# Patient Record
Sex: MALE | Race: BLACK OR AFRICAN AMERICAN | Employment: FULL TIME | URBAN - METROPOLITAN AREA
[De-identification: names, ages, dates, MRNs, and addresses within clinical notes are randomized per-mention and may not be internally consistent; named-entity substitution may affect disease eponyms.]

---

## 2019-07-10 ENCOUNTER — APPOINTMENT (OUTPATIENT)
Dept: GENERAL RADIOLOGY | Age: 44
End: 2019-07-10
Attending: EMERGENCY MEDICINE
Payer: SELF-PAY

## 2019-07-10 ENCOUNTER — HOSPITAL ENCOUNTER (OUTPATIENT)
Age: 44
Setting detail: OBSERVATION
Discharge: HOME | End: 2019-07-12
Attending: EMERGENCY MEDICINE | Admitting: INTERNAL MEDICINE
Payer: SELF-PAY

## 2019-07-10 ENCOUNTER — APPOINTMENT (OUTPATIENT)
Dept: CT IMAGING | Age: 44
End: 2019-07-10
Attending: EMERGENCY MEDICINE
Payer: SELF-PAY

## 2019-07-10 DIAGNOSIS — I16.9 HYPERTENSIVE CRISIS: Primary | ICD-10-CM

## 2019-07-10 DIAGNOSIS — R42 DIZZINESS: ICD-10-CM

## 2019-07-10 LAB
ALBUMIN SERPL-MCNC: 3.9 G/DL (ref 3.4–5)
ALBUMIN/GLOB SERPL: 1 {RATIO} (ref 0.8–1.7)
ALP SERPL-CCNC: 112 U/L (ref 45–117)
ALT SERPL-CCNC: 30 U/L (ref 16–61)
ANION GAP SERPL CALC-SCNC: 8 MMOL/L (ref 3–18)
AST SERPL-CCNC: 20 U/L (ref 15–37)
ATRIAL RATE: 61 BPM
BASOPHILS # BLD: 0 K/UL (ref 0–0.1)
BASOPHILS NFR BLD: 0 % (ref 0–2)
BILIRUB SERPL-MCNC: 0.4 MG/DL (ref 0.2–1)
BUN SERPL-MCNC: 11 MG/DL (ref 7–18)
BUN/CREAT SERPL: 11 (ref 12–20)
CALCIUM SERPL-MCNC: 9.1 MG/DL (ref 8.5–10.1)
CALCULATED P AXIS, ECG09: 23 DEGREES
CALCULATED R AXIS, ECG10: 59 DEGREES
CALCULATED T AXIS, ECG11: -69 DEGREES
CHLORIDE SERPL-SCNC: 103 MMOL/L (ref 100–108)
CK MB CFR SERPL CALC: 1.6 % (ref 0–4)
CK MB SERPL-MCNC: 2 NG/ML (ref 5–25)
CK SERPL-CCNC: 125 U/L (ref 39–308)
CO2 SERPL-SCNC: 28 MMOL/L (ref 21–32)
CREAT SERPL-MCNC: 1.02 MG/DL (ref 0.6–1.3)
DIAGNOSIS, 93000: NORMAL
DIFFERENTIAL METHOD BLD: ABNORMAL
EOSINOPHIL # BLD: 0 K/UL (ref 0–0.4)
EOSINOPHIL NFR BLD: 0 % (ref 0–5)
ERYTHROCYTE [DISTWIDTH] IN BLOOD BY AUTOMATED COUNT: 14 % (ref 11.6–14.5)
GLOBULIN SER CALC-MCNC: 4.1 G/DL (ref 2–4)
GLUCOSE SERPL-MCNC: 150 MG/DL (ref 74–99)
HCT VFR BLD AUTO: 47.2 % (ref 36–48)
HGB BLD-MCNC: 15.5 G/DL (ref 13–16)
LYMPHOCYTES # BLD: 1.2 K/UL (ref 0.9–3.6)
LYMPHOCYTES NFR BLD: 9 % (ref 21–52)
MCH RBC QN AUTO: 27.4 PG (ref 24–34)
MCHC RBC AUTO-ENTMCNC: 32.8 G/DL (ref 31–37)
MCV RBC AUTO: 83.4 FL (ref 74–97)
MONOCYTES # BLD: 0.5 K/UL (ref 0.05–1.2)
MONOCYTES NFR BLD: 4 % (ref 3–10)
NEUTS SEG # BLD: 10.9 K/UL (ref 1.8–8)
NEUTS SEG NFR BLD: 87 % (ref 40–73)
P-R INTERVAL, ECG05: 214 MS
PLATELET # BLD AUTO: 238 K/UL (ref 135–420)
PMV BLD AUTO: 9.3 FL (ref 9.2–11.8)
POTASSIUM SERPL-SCNC: 4 MMOL/L (ref 3.5–5.5)
PROT SERPL-MCNC: 8 G/DL (ref 6.4–8.2)
Q-T INTERVAL, ECG07: 460 MS
QRS DURATION, ECG06: 90 MS
QTC CALCULATION (BEZET), ECG08: 463 MS
RBC # BLD AUTO: 5.66 M/UL (ref 4.7–5.5)
SODIUM SERPL-SCNC: 139 MMOL/L (ref 136–145)
TROPONIN I SERPL-MCNC: <0.02 NG/ML (ref 0–0.04)
VENTRICULAR RATE, ECG03: 61 BPM
WBC # BLD AUTO: 12.6 K/UL (ref 4.6–13.2)

## 2019-07-10 PROCEDURE — 70450 CT HEAD/BRAIN W/O DYE: CPT

## 2019-07-10 PROCEDURE — 96374 THER/PROPH/DIAG INJ IV PUSH: CPT

## 2019-07-10 PROCEDURE — 96375 TX/PRO/DX INJ NEW DRUG ADDON: CPT

## 2019-07-10 PROCEDURE — 93005 ELECTROCARDIOGRAM TRACING: CPT

## 2019-07-10 PROCEDURE — 74011250636 HC RX REV CODE- 250/636: Performed by: EMERGENCY MEDICINE

## 2019-07-10 PROCEDURE — 96361 HYDRATE IV INFUSION ADD-ON: CPT

## 2019-07-10 PROCEDURE — 85025 COMPLETE CBC W/AUTO DIFF WBC: CPT

## 2019-07-10 PROCEDURE — 71045 X-RAY EXAM CHEST 1 VIEW: CPT

## 2019-07-10 PROCEDURE — 80053 COMPREHEN METABOLIC PANEL: CPT

## 2019-07-10 PROCEDURE — 99285 EMERGENCY DEPT VISIT HI MDM: CPT

## 2019-07-10 PROCEDURE — 82550 ASSAY OF CK (CPK): CPT

## 2019-07-10 RX ORDER — MECLIZINE HCL 12.5 MG 12.5 MG/1
25 TABLET ORAL
Status: COMPLETED | OUTPATIENT
Start: 2019-07-10 | End: 2019-07-10

## 2019-07-10 RX ORDER — ONDANSETRON 2 MG/ML
4 INJECTION INTRAMUSCULAR; INTRAVENOUS
Status: COMPLETED | OUTPATIENT
Start: 2019-07-10 | End: 2019-07-10

## 2019-07-10 RX ORDER — LABETALOL HCL 20 MG/4 ML
20 SYRINGE (ML) INTRAVENOUS
Status: COMPLETED | OUTPATIENT
Start: 2019-07-10 | End: 2019-07-10

## 2019-07-10 RX ADMIN — LABETALOL 20 MG/4 ML (5 MG/ML) INTRAVENOUS SYRINGE 20 MG: at 19:54

## 2019-07-10 RX ADMIN — ONDANSETRON 4 MG: 2 INJECTION INTRAMUSCULAR; INTRAVENOUS at 19:54

## 2019-07-10 RX ADMIN — SODIUM CHLORIDE 1000 ML: 900 INJECTION, SOLUTION INTRAVENOUS at 20:22

## 2019-07-10 RX ADMIN — MECLIZINE 25 MG: 12.5 TABLET ORAL at 19:55

## 2019-07-10 NOTE — ED PROVIDER NOTES
EMERGENCY DEPARTMENT HISTORY AND PHYSICAL EXAM    Date: 7/10/2019  Patient Name: Aldair Mcmullen    History of Presenting Illness     Chief Complaint   Patient presents with    Dizziness    Vomiting    Hypertension         HPI:   7:22 PM  Aldair Mcmullen is a 41 y.o. male with no significant PMHX  who presents to the emergency department C/O dizziness. Patient states he generally has not felt well all week. He however this morning was laying in bed until about 1 PM when he tried to get out of bed and felt dizzy. He describes the dizziness is spinning, this was associated with nausea vomiting for about 4 hours. He states he has right lazy eye, otherwise no visual problems. He states the nausea vomiting is improved since he arrived in the ED but the dizziness is changed now feeling off balance when he tries to stand. He has no numbness tingling or weakness in the in of his extremities. Patient denies any history of hypertension, he does not smoke, no history of diabetes or hypercholesterolemia, he however states his mother had stroke same age as he is. PCP: Other, MD Greg        Past History     Past Medical History:  History reviewed. No pertinent past medical history. Past Surgical History:  History reviewed. No pertinent surgical history. Family History:  History reviewed. No pertinent family history. Social History:  Social History     Tobacco Use    Smoking status: Current Every Day Smoker     Packs/day: 0.25    Smokeless tobacco: Never Used   Substance Use Topics    Alcohol use: Yes     Alcohol/week: 0.6 oz     Types: 1 Cans of beer per week     Frequency: Never    Drug use: Never       Allergies:  No Known Allergies      Review of Systems   Review of Systems   Constitutional: Negative for chills and fever. HENT: Negative for congestion and sore throat. Respiratory: Negative for cough and shortness of breath. Cardiovascular: Negative for chest pain and palpitations. Gastrointestinal: Negative for abdominal pain, nausea and vomiting. Genitourinary: Negative for dysuria, flank pain and frequency. Musculoskeletal: Negative for arthralgias, joint swelling and myalgias. Skin: Negative for color change and wound. Neurological: Positive for dizziness. Negative for weakness, light-headedness and headaches. Hematological: Negative for adenopathy. Physical Exam     Vitals:    07/10/19 2230 07/10/19 2245 07/10/19 2330 07/10/19 2345   BP: (!) 162/95 (!) 176/93 (!) 166/91 153/89   Pulse: 64 69 62 64   Resp: 17 19 19 21   Temp:  98.4 °F (36.9 °C)     SpO2: 100% 100% 100% 100%   Weight:       Height:         Physical Exam   Constitutional: He is oriented to person, place, and time. He appears well-developed and well-nourished. No distress. Patient appears in no distress. HENT:   Head: Normocephalic and atraumatic. Head is without right periorbital erythema and without left periorbital erythema. Right Ear: External ear normal. No drainage or swelling. Tympanic membrane is not perforated, not erythematous and not bulging. Left Ear: External ear normal. No drainage or swelling. Tympanic membrane is not perforated, not erythematous and not bulging. Nose: Nose normal. No mucosal edema or rhinorrhea. Right sinus exhibits no maxillary sinus tenderness and no frontal sinus tenderness. Left sinus exhibits no maxillary sinus tenderness and no frontal sinus tenderness. Mouth/Throat: Uvula is midline, oropharynx is clear and moist and mucous membranes are normal. No oral lesions. No trismus in the jaw. No dental abscesses or uvula swelling. No oropharyngeal exudate, posterior oropharyngeal edema, posterior oropharyngeal erythema or tonsillar abscesses. Eyes: Conjunctivae are normal. Right eye exhibits no discharge. Left eye exhibits no discharge. No scleral icterus. He has strabismus of the right eye, and horizontal nystagmus left eye. Neck: Normal range of motion. Neck supple. Cardiovascular: Normal rate, regular rhythm, normal heart sounds and intact distal pulses. Exam reveals no gallop and no friction rub. No murmur heard. Pulmonary/Chest: Effort normal and breath sounds normal. No accessory muscle usage. No tachypnea. No respiratory distress. He has no decreased breath sounds. He has no wheezes. He has no rhonchi. He has no rales. Abdominal: Soft. Bowel sounds are normal. He exhibits no distension. There is no tenderness. Musculoskeletal: Normal range of motion. He exhibits no edema or tenderness. Lymphadenopathy:     He has no cervical adenopathy. Neurological: He is alert and oriented to person, place, and time. No cranial nerve deficit. Coordination normal.   Skin: Skin is warm and dry. He is not diaphoretic. Psychiatric: He has a normal mood and affect. Judgment normal.   Nursing note and vitals reviewed.         Diagnostic Study Results     Labs -     Recent Results (from the past 12 hour(s))   EKG, 12 LEAD, INITIAL    Collection Time: 07/10/19  6:55 PM   Result Value Ref Range    Ventricular Rate 61 BPM    Atrial Rate 61 BPM    P-R Interval 214 ms    QRS Duration 90 ms    Q-T Interval 460 ms    QTC Calculation (Bezet) 463 ms    Calculated P Axis 23 degrees    Calculated R Axis 59 degrees    Calculated T Axis -69 degrees    Diagnosis       Sinus rhythm with 1st degree AV block  Left ventricular hypertrophy  Anterior infarct , age undetermined  T wave abnormality, consider inferolateral ischemia  Abnormal ECG  Confirmed by Drea Navarro MD, [facility] ([id number]) on 7/10/2019 8:35:34 PM     CBC WITH AUTOMATED DIFF    Collection Time: 07/10/19  7:05 PM   Result Value Ref Range    WBC 12.6 4.6 - 13.2 K/uL    RBC 5.66 (H) 4.70 - 5.50 M/uL    HGB 15.5 13.0 - 16.0 g/dL    HCT 47.2 36.0 - 48.0 %    MCV 83.4 74.0 - 97.0 FL    MCH 27.4 24.0 - 34.0 PG    MCHC 32.8 31.0 - 37.0 g/dL    RDW 14.0 11.6 - 14.5 %    PLATELET 835 324 - 299 K/uL    MPV 9.3 9.2 - 11.8 FL    NEUTROPHILS 87 (H) 40 - 73 %    LYMPHOCYTES 9 (L) 21 - 52 %    MONOCYTES 4 3 - 10 %    EOSINOPHILS 0 0 - 5 %    BASOPHILS 0 0 - 2 %    ABS. NEUTROPHILS 10.9 (H) 1.8 - 8.0 K/UL    ABS. LYMPHOCYTES 1.2 0.9 - 3.6 K/UL    ABS. MONOCYTES 0.5 0.05 - 1.2 K/UL    ABS. EOSINOPHILS 0.0 0.0 - 0.4 K/UL    ABS. BASOPHILS 0.0 0.0 - 0.1 K/UL    DF AUTOMATED     METABOLIC PANEL, COMPREHENSIVE    Collection Time: 07/10/19  7:05 PM   Result Value Ref Range    Sodium 139 136 - 145 mmol/L    Potassium 4.0 3.5 - 5.5 mmol/L    Chloride 103 100 - 108 mmol/L    CO2 28 21 - 32 mmol/L    Anion gap 8 3.0 - 18 mmol/L    Glucose 150 (H) 74 - 99 mg/dL    BUN 11 7.0 - 18 MG/DL    Creatinine 1.02 0.6 - 1.3 MG/DL    BUN/Creatinine ratio 11 (L) 12 - 20      GFR est AA >60 >60 ml/min/1.73m2    GFR est non-AA >60 >60 ml/min/1.73m2    Calcium 9.1 8.5 - 10.1 MG/DL    Bilirubin, total 0.4 0.2 - 1.0 MG/DL    ALT (SGPT) 30 16 - 61 U/L    AST (SGOT) 20 15 - 37 U/L    Alk. phosphatase 112 45 - 117 U/L    Protein, total 8.0 6.4 - 8.2 g/dL    Albumin 3.9 3.4 - 5.0 g/dL    Globulin 4.1 (H) 2.0 - 4.0 g/dL    A-G Ratio 1.0 0.8 - 1.7     CARDIAC PANEL,(CK, CKMB & TROPONIN)    Collection Time: 07/10/19  7:05 PM   Result Value Ref Range     39 - 308 U/L    CK - MB 2.0 <3.6 ng/ml    CK-MB Index 1.6 0.0 - 4.0 %    Troponin-I, QT <0.02 0.0 - 0.045 NG/ML       Radiologic Studies -   CT HEAD WO CONT   Final Result   IMPRESSION:      No acute intracranial abnormalities. XR CHEST PORT    (Results Pending)   MRI BRAIN WO CONT    (Results Pending)     CT Results  (Last 48 hours)               07/10/19 2010  CT HEAD WO CONT Final result    Impression:  IMPRESSION:       No acute intracranial abnormalities. Narrative:  EXAM: CT head       INDICATION: Unsteady gait       COMPARISON: None.        TECHNIQUE: Axial CT imaging of the head was performed without intravenous   contrast. One or more dose reduction techniques were used on this CT: automated   exposure control, adjustment of the mAs and/or kVp according to patient size,   and iterative reconstruction techniques. The specific techniques used on this   CT exam have been documented in the patient's electronic medical record. Digital   Imaging and Communications in Medicine (DICOM) format image data are available   to nonaffiliated external healthcare facilities or entities on a secure, media   free, reciprocally searchable basis with patient authorization for at least a   12-month period after this study. _______________       FINDINGS:       BRAIN AND POSTERIOR FOSSA: The sulci, folia, ventricles and basal cisterns are   within normal limits for the patient's age. There is no intracranial hemorrhage,   mass effect, or midline shift. There are no areas of abnormal parenchymal   attenuation. EXTRA-AXIAL SPACES AND MENINGES: There are no abnormal extra-axial fluid   collections. CALVARIUM: Intact. SINUSES: Clear. OTHER: None.       _______________               CXR Results  (Last 48 hours)    None          Medications given in the ED-  Medications   sodium chloride 0.9 % bolus infusion 1,000 mL (0 mL IntraVENous IV Completed 7/10/19 2317)   ondansetron (ZOFRAN) injection 4 mg (4 mg IntraVENous Given 7/10/19 1954)   meclizine (ANTIVERT) tablet 25 mg (25 mg Oral Given 7/10/19 1955)   labetalol (NORMODYNE;TRANDATE) 20 mg/4 mL (5 mg/mL) injection 20 mg (20 mg IntraVENous Given 7/10/19 1954)         Medical Decision Making   I am the first provider for this patient. I reviewed the vital signs, available nursing notes, past medical history, past surgical history, family history and social history. Vital Signs-Reviewed the patient's vital signs.     Pulse Oximetry Analysis - 100% on RA     Cardiac Monitor:  Rate: 55 bpm  Rhythm: Sinus    EKG interpretation: (Preliminary)  7:22 PM   NSR, Rate 61, Nonspecific ST-T      Records Reviewed: Nursing Notes and Old Medical Records    Provider Notes (Medical Decision Making):   Patient presents with high blood pressure initially 241/128, blood pressure associated with dizziness and nausea and vomiting. Differential includes symptomatic hypertension, at present in crisis, labyrinthitis, vertigo, electrolyte imbalance. Patient being admitted for control of blood pressure and also a evaluation for TIA. 12:10 AM  I have spent 45 minutes of critical care time involved in lab review, consultations with specialist, family decision-making, and documentation. During this entire length of time I was immediately available to the patient. Critical Care: The reason for providing this level of medical care for this critically ill patient was due a critical illness that impaired one or more vital organ systems such that there was a high probability of imminent or life threatening deterioration in the patients condition. This care involved high complexity decision making to assess, manipulate, and support vital system functions, to treat this degreee vital organ system failure and to prevent further life threatening deterioration of the patients condition. Procedures:  Procedures    ED Course:   7:22 PM Initial assessment performed. The patients presenting problems have been discussed, and they are in agreement with the care plan formulated and outlined with them. I have encouraged them to ask questions as they arise throughout their visit. .CONSULT NOTE:   10:33 PM  Spoke with Giovany Deal   Specialty: Neurology  Discussed pt's hx, disposition, and available diagnostic and imaging results over the telephone. Reviewed care plans. Consulting physician agrees with plans as outlined. Will follow consult  CONSULT NOTE:   10:33 PM  Spoke with Mata Haji   Specialty: Hospitalist  Discussed pt's hx, disposition, and available diagnostic and imaging results over the telephone. Reviewed care plans. Consulting physician agrees with plans as outlined. Will admit. Vilma Brown Diagnosis and Disposition     Critical Care Time: 45    Core Measures:  For Hospitalized Patients:    1. Hospitalization Decision Time:  The decision to hospitalize the patient was made  at 10:33 PM on 7/10/2019    2. Aspirin: Aspirin was given    12:11 AM  Patient is being admitted to the hospital by Stacy Quezada. The results of their tests and reasons for their admission have been discussed with them and/or available family. They convey agreement and understanding for the need to be admitted and for their admission diagnosis. CONDITIONS ON ADMISSION:  Sepsis is not present at the time of admission. Deep Vein Thrombosis is not present at the time of admission. Thrombosis is not present at the time of admission. Urinary Tract Infection is not present at the time of admission. Pneumonia is not present at the time of admission. MRSA is not present at the time of admission. Wound infection is not present at the time of admission. Pressure Ulcer is not present at the time of admission. DCLINICAL IMPRESSION:    1. Hypertensive crisis    2.  Dizziness        PLAN: Admit to [facility]

## 2019-07-10 NOTE — ED TRIAGE NOTES
Patient reports waking up at 1230 and feeling off balance, patient states he has been vomiting for past 3 hours, patient drinking sprite in triage, a/ox4, moves all extremities, light sensitivity

## 2019-07-10 NOTE — ED NOTES
Report given to Maurice Suero RN. Opportunity for questions provided. Pt waiting to see provider at this time.

## 2019-07-11 ENCOUNTER — APPOINTMENT (OUTPATIENT)
Dept: CT IMAGING | Age: 44
End: 2019-07-11
Attending: INTERNAL MEDICINE
Payer: SELF-PAY

## 2019-07-11 ENCOUNTER — HOSPITAL ENCOUNTER (OUTPATIENT)
Dept: ULTRASOUND IMAGING | Age: 44
Setting detail: OBSERVATION
Discharge: HOME | End: 2019-07-11
Attending: INTERNAL MEDICINE
Payer: SELF-PAY

## 2019-07-11 ENCOUNTER — APPOINTMENT (OUTPATIENT)
Dept: NON INVASIVE DIAGNOSTICS | Age: 44
End: 2019-07-11
Attending: INTERNAL MEDICINE
Payer: SELF-PAY

## 2019-07-11 ENCOUNTER — APPOINTMENT (OUTPATIENT)
Dept: MRI IMAGING | Age: 44
End: 2019-07-11
Attending: EMERGENCY MEDICINE
Payer: SELF-PAY

## 2019-07-11 PROBLEM — I16.9 HYPERTENSIVE CRISIS: Status: ACTIVE | Noted: 2019-07-11

## 2019-07-11 PROBLEM — R42 DIZZINESS: Status: ACTIVE | Noted: 2019-07-11

## 2019-07-11 PROBLEM — I16.0 HYPERTENSIVE URGENCY: Status: ACTIVE | Noted: 2019-07-11

## 2019-07-11 PROBLEM — I16.9 HYPERTENSIVE CRISIS: Status: RESOLVED | Noted: 2019-07-11 | Resolved: 2019-07-11

## 2019-07-11 PROBLEM — E11.65 TYPE 2 DIABETES MELLITUS WITH HYPERGLYCEMIA, WITHOUT LONG-TERM CURRENT USE OF INSULIN (HCC): Status: ACTIVE | Noted: 2019-07-11

## 2019-07-11 LAB
AMPHET UR QL SCN: NEGATIVE
APPEARANCE UR: CLEAR
BACTERIA URNS QL MICRO: ABNORMAL /HPF
BARBITURATES UR QL SCN: NEGATIVE
BENZODIAZ UR QL: NEGATIVE
BILIRUB UR QL: NEGATIVE
CANNABINOIDS UR QL SCN: POSITIVE
CK MB CFR SERPL CALC: 1.3 % (ref 0–4)
CK MB CFR SERPL CALC: 1.5 % (ref 0–4)
CK MB CFR SERPL CALC: 1.6 % (ref 0–4)
CK MB SERPL-MCNC: 2.3 NG/ML (ref 5–25)
CK MB SERPL-MCNC: 2.7 NG/ML (ref 5–25)
CK MB SERPL-MCNC: 3 NG/ML (ref 5–25)
CK SERPL-CCNC: 172 U/L (ref 39–308)
CK SERPL-CCNC: 175 U/L (ref 39–308)
CK SERPL-CCNC: 192 U/L (ref 39–308)
COCAINE UR QL SCN: NEGATIVE
COLOR UR: YELLOW
ECHO AO ASC DIAM: 2.71 CM
ECHO AO ROOT DIAM: 2.99 CM
ECHO AV AREA PEAK VELOCITY: 1.9 CM2
ECHO AV AREA VTI: 2.1 CM2
ECHO AV AREA/BSA PEAK VELOCITY: 0.9 CM2/M2
ECHO AV AREA/BSA VTI: 1 CM2/M2
ECHO AV MEAN GRADIENT: 7.4 MMHG
ECHO AV PEAK GRADIENT: 13.7 MMHG
ECHO AV PEAK VELOCITY: 185.33 CM/S
ECHO AV VTI: 32.11 CM
ECHO IVC PROX: 1.06 CM
ECHO LA MAJOR AXIS: 4.61 CM
ECHO LA VOL 2C: 57.24 ML (ref 18–58)
ECHO LA VOL 4C: 55.7 ML (ref 18–58)
ECHO LA VOL BP: 56.56 ML (ref 18–58)
ECHO LA VOL/BSA BIPLANE: 27.1 ML/M2 (ref 16–28)
ECHO LA VOLUME INDEX A2C: 27.42 ML/M2 (ref 16–28)
ECHO LA VOLUME INDEX A4C: 26.69 ML/M2 (ref 16–28)
ECHO LV E' LATERAL VELOCITY: 8 CM/S
ECHO LV E' SEPTAL VELOCITY: 5 CM/S
ECHO LV EDV A2C: 103.6 ML
ECHO LV EDV A4C: 128 ML
ECHO LV EDV BP: 120 ML (ref 67–155)
ECHO LV EDV INDEX A4C: 61.3 ML/M2
ECHO LV EDV INDEX BP: 57.5 ML/M2
ECHO LV EDV NDEX A2C: 49.6 ML/M2
ECHO LV EJECTION FRACTION A2C: 61 %
ECHO LV EJECTION FRACTION A4C: 62 %
ECHO LV EJECTION FRACTION BIPLANE: 63.8 % (ref 55–100)
ECHO LV ESV A2C: 40.2 ML
ECHO LV ESV A4C: 48.6 ML
ECHO LV ESV BP: 43.4 ML (ref 22–58)
ECHO LV ESV INDEX A2C: 19.3 ML/M2
ECHO LV ESV INDEX A4C: 23.3 ML/M2
ECHO LV ESV INDEX BP: 20.8 ML/M2
ECHO LV INTERNAL DIMENSION DIASTOLIC: 4.46 CM (ref 4.2–5.9)
ECHO LV INTERNAL DIMENSION SYSTOLIC: 3.14 CM
ECHO LV IVSD: 1.45 CM (ref 0.6–1)
ECHO LV MASS 2D: 292.4 G (ref 88–224)
ECHO LV MASS INDEX 2D: 140.1 G/M2 (ref 49–115)
ECHO LV POSTERIOR WALL DIASTOLIC: 1.35 CM (ref 0.6–1)
ECHO LVOT DIAM: 1.99 CM
ECHO LVOT PEAK GRADIENT: 4.9 MMHG
ECHO LVOT PEAK VELOCITY: 110.88 CM/S
ECHO LVOT VTI: 21.4 CM
ECHO MV A VELOCITY: 79.91 CM/S
ECHO MV AREA PHT: 2.3 CM2
ECHO MV E DECELERATION TIME (DT): 336.6 MS
ECHO MV E VELOCITY: 90.69 CM/S
ECHO MV E/A RATIO: 1.13
ECHO MV E/E' LATERAL: 11.34
ECHO MV E/E' RATIO (AVERAGED): 14.74
ECHO MV E/E' SEPTAL: 18.14
ECHO MV PRESSURE HALF TIME (PHT): 97.6 MS
ECHO PULMONARY ARTERY SYSTOLIC PRESSURE (PASP): 29 MMHG
ECHO RA AREA 4C: 14.38 CM2
ECHO RV INTERNAL DIMENSION: 4.49 CM
ECHO TRICUSPID ANNULAR PEAK SYSTOLIC VELOCITY: 2.6 CM/S
ECHO TV REGURGITANT MAX VELOCITY: 250.82 CM/S
ECHO TV REGURGITANT PEAK GRADIENT: 25.2 MMHG
EPITH CASTS URNS QL MICRO: ABNORMAL /LPF (ref 0–5)
EST. AVERAGE GLUCOSE BLD GHB EST-MCNC: 100 MG/DL
GLUCOSE BLD STRIP.AUTO-MCNC: 100 MG/DL (ref 70–110)
GLUCOSE BLD STRIP.AUTO-MCNC: 146 MG/DL (ref 70–110)
GLUCOSE BLD STRIP.AUTO-MCNC: 89 MG/DL (ref 70–110)
GLUCOSE BLD STRIP.AUTO-MCNC: 93 MG/DL (ref 70–110)
GLUCOSE UR STRIP.AUTO-MCNC: NEGATIVE MG/DL
HBA1C MFR BLD: 5.1 % (ref 4.2–5.6)
HDSCOM,HDSCOM: ABNORMAL
HGB UR QL STRIP: ABNORMAL
KETONES UR QL STRIP.AUTO: NEGATIVE MG/DL
LEUKOCYTE ESTERASE UR QL STRIP.AUTO: NEGATIVE
METHADONE UR QL: NEGATIVE
NITRITE UR QL STRIP.AUTO: NEGATIVE
OPIATES UR QL: NEGATIVE
PCP UR QL: NEGATIVE
PH UR STRIP: 7 [PH] (ref 5–8)
PROT UR STRIP-MCNC: NEGATIVE MG/DL
RBC #/AREA URNS HPF: ABNORMAL /HPF (ref 0–5)
SP GR UR REFRACTOMETRY: >1.03 (ref 1–1.03)
TROPONIN I SERPL-MCNC: <0.02 NG/ML (ref 0–0.04)
UROBILINOGEN UR QL STRIP.AUTO: 1 EU/DL (ref 0.2–1)
WBC URNS QL MICRO: ABNORMAL /HPF (ref 0–5)

## 2019-07-11 PROCEDURE — 70496 CT ANGIOGRAPHY HEAD: CPT

## 2019-07-11 PROCEDURE — 74011636320 HC RX REV CODE- 636/320: Performed by: INTERNAL MEDICINE

## 2019-07-11 PROCEDURE — 82962 GLUCOSE BLOOD TEST: CPT

## 2019-07-11 PROCEDURE — 74011250636 HC RX REV CODE- 250/636: Performed by: HOSPITALIST

## 2019-07-11 PROCEDURE — 36415 COLL VENOUS BLD VENIPUNCTURE: CPT

## 2019-07-11 PROCEDURE — 74011250637 HC RX REV CODE- 250/637: Performed by: HOSPITALIST

## 2019-07-11 PROCEDURE — 99218 HC RM OBSERVATION: CPT

## 2019-07-11 PROCEDURE — 92610 EVALUATE SWALLOWING FUNCTION: CPT

## 2019-07-11 PROCEDURE — 81001 URINALYSIS AUTO W/SCOPE: CPT

## 2019-07-11 PROCEDURE — 74011250637 HC RX REV CODE- 250/637: Performed by: INTERNAL MEDICINE

## 2019-07-11 PROCEDURE — 76770 US EXAM ABDO BACK WALL COMP: CPT

## 2019-07-11 PROCEDURE — 70551 MRI BRAIN STEM W/O DYE: CPT

## 2019-07-11 PROCEDURE — 93306 TTE W/DOPPLER COMPLETE: CPT

## 2019-07-11 PROCEDURE — 74011250636 HC RX REV CODE- 250/636: Performed by: INTERNAL MEDICINE

## 2019-07-11 PROCEDURE — 83036 HEMOGLOBIN GLYCOSYLATED A1C: CPT

## 2019-07-11 PROCEDURE — 82550 ASSAY OF CK (CPK): CPT

## 2019-07-11 PROCEDURE — 80307 DRUG TEST PRSMV CHEM ANLYZR: CPT

## 2019-07-11 RX ORDER — MECLIZINE HCL 12.5 MG 12.5 MG/1
25 TABLET ORAL
Status: DISCONTINUED | OUTPATIENT
Start: 2019-07-11 | End: 2019-07-12 | Stop reason: HOSPADM

## 2019-07-11 RX ORDER — GUAIFENESIN 100 MG/5ML
81 LIQUID (ML) ORAL DAILY
Status: DISCONTINUED | OUTPATIENT
Start: 2019-07-11 | End: 2019-07-12 | Stop reason: HOSPADM

## 2019-07-11 RX ORDER — SODIUM CHLORIDE 9 MG/ML
125 INJECTION, SOLUTION INTRAVENOUS CONTINUOUS
Status: DISCONTINUED | OUTPATIENT
Start: 2019-07-11 | End: 2019-07-11

## 2019-07-11 RX ORDER — SODIUM CHLORIDE 0.9 % (FLUSH) 0.9 %
5-40 SYRINGE (ML) INJECTION AS NEEDED
Status: DISCONTINUED | OUTPATIENT
Start: 2019-07-11 | End: 2019-07-12 | Stop reason: HOSPADM

## 2019-07-11 RX ORDER — MAGNESIUM SULFATE 100 %
4 CRYSTALS MISCELLANEOUS AS NEEDED
Status: DISCONTINUED | OUTPATIENT
Start: 2019-07-11 | End: 2019-07-12 | Stop reason: HOSPADM

## 2019-07-11 RX ORDER — SODIUM CHLORIDE 0.9 % (FLUSH) 0.9 %
5-40 SYRINGE (ML) INJECTION EVERY 8 HOURS
Status: DISCONTINUED | OUTPATIENT
Start: 2019-07-11 | End: 2019-07-12 | Stop reason: HOSPADM

## 2019-07-11 RX ORDER — LABETALOL HCL 20 MG/4 ML
20 SYRINGE (ML) INTRAVENOUS
Status: DISCONTINUED | OUTPATIENT
Start: 2019-07-11 | End: 2019-07-11

## 2019-07-11 RX ORDER — SODIUM CHLORIDE 9 MG/ML
30 INJECTION INTRAMUSCULAR; INTRAVENOUS; SUBCUTANEOUS ONCE
Status: DISPENSED | OUTPATIENT
Start: 2019-07-11 | End: 2019-07-11

## 2019-07-11 RX ORDER — ATORVASTATIN CALCIUM 20 MG/1
80 TABLET, FILM COATED ORAL
Status: DISCONTINUED | OUTPATIENT
Start: 2019-07-11 | End: 2019-07-12 | Stop reason: HOSPADM

## 2019-07-11 RX ORDER — LABETALOL HCL 20 MG/4 ML
20 SYRINGE (ML) INTRAVENOUS
Status: DISCONTINUED | OUTPATIENT
Start: 2019-07-11 | End: 2019-07-12 | Stop reason: HOSPADM

## 2019-07-11 RX ORDER — SODIUM CHLORIDE 450 MG/100ML
50 INJECTION, SOLUTION INTRAVENOUS CONTINUOUS
Status: DISCONTINUED | OUTPATIENT
Start: 2019-07-11 | End: 2019-07-11

## 2019-07-11 RX ORDER — SODIUM CHLORIDE 9 MG/ML
INJECTION INTRAMUSCULAR; INTRAVENOUS; SUBCUTANEOUS
Status: DISPENSED
Start: 2019-07-11 | End: 2019-07-11

## 2019-07-11 RX ORDER — SODIUM CHLORIDE 9 MG/ML
15 INJECTION INTRAMUSCULAR; INTRAVENOUS; SUBCUTANEOUS ONCE
Status: DISPENSED | OUTPATIENT
Start: 2019-07-11 | End: 2019-07-11

## 2019-07-11 RX ORDER — INSULIN LISPRO 100 [IU]/ML
INJECTION, SOLUTION INTRAVENOUS; SUBCUTANEOUS
Status: DISCONTINUED | OUTPATIENT
Start: 2019-07-11 | End: 2019-07-12 | Stop reason: HOSPADM

## 2019-07-11 RX ORDER — HEPARIN SODIUM 5000 [USP'U]/ML
5000 INJECTION, SOLUTION INTRAVENOUS; SUBCUTANEOUS EVERY 8 HOURS
Status: DISCONTINUED | OUTPATIENT
Start: 2019-07-11 | End: 2019-07-12 | Stop reason: HOSPADM

## 2019-07-11 RX ORDER — ACETAMINOPHEN 325 MG/1
650 TABLET ORAL
Status: DISCONTINUED | OUTPATIENT
Start: 2019-07-11 | End: 2019-07-12 | Stop reason: HOSPADM

## 2019-07-11 RX ORDER — ONDANSETRON 2 MG/ML
4 INJECTION INTRAMUSCULAR; INTRAVENOUS
Status: DISCONTINUED | OUTPATIENT
Start: 2019-07-11 | End: 2019-07-12 | Stop reason: HOSPADM

## 2019-07-11 RX ORDER — AMLODIPINE BESYLATE 5 MG/1
10 TABLET ORAL DAILY
Status: DISCONTINUED | OUTPATIENT
Start: 2019-07-11 | End: 2019-07-12 | Stop reason: HOSPADM

## 2019-07-11 RX ADMIN — Medication 10 ML: at 22:00

## 2019-07-11 RX ADMIN — Medication 10 ML: at 05:30

## 2019-07-11 RX ADMIN — HEPARIN SODIUM 5000 UNITS: 5000 INJECTION INTRAVENOUS; SUBCUTANEOUS at 12:30

## 2019-07-11 RX ADMIN — AMLODIPINE BESYLATE 10 MG: 5 TABLET ORAL at 07:56

## 2019-07-11 RX ADMIN — HYDROCHLOROTHIAZIDE: 12.5 CAPSULE ORAL at 16:37

## 2019-07-11 RX ADMIN — SODIUM CHLORIDE 125 ML/HR: 900 INJECTION, SOLUTION INTRAVENOUS at 05:36

## 2019-07-11 RX ADMIN — HEPARIN SODIUM 5000 UNITS: 5000 INJECTION INTRAVENOUS; SUBCUTANEOUS at 05:29

## 2019-07-11 RX ADMIN — IOPAMIDOL 100 ML: 755 INJECTION, SOLUTION INTRAVENOUS at 09:00

## 2019-07-11 RX ADMIN — ASPIRIN 81 MG 81 MG: 81 TABLET ORAL at 09:59

## 2019-07-11 RX ADMIN — LABETALOL 20 MG/4 ML (5 MG/ML) INTRAVENOUS SYRINGE 20 MG: at 18:00

## 2019-07-11 RX ADMIN — Medication 10 ML: at 05:31

## 2019-07-11 RX ADMIN — ATORVASTATIN CALCIUM 80 MG: 20 TABLET, FILM COATED ORAL at 05:29

## 2019-07-11 RX ADMIN — HEPARIN SODIUM 5000 UNITS: 5000 INJECTION INTRAVENOUS; SUBCUTANEOUS at 21:59

## 2019-07-11 RX ADMIN — Medication 10 ML: at 22:01

## 2019-07-11 RX ADMIN — ATORVASTATIN CALCIUM 80 MG: 20 TABLET, FILM COATED ORAL at 21:59

## 2019-07-11 NOTE — PROGRESS NOTES
1549: Assumed care of pt from Norberto Paul 835.  1630: spoke with  regarding pt B/P new orders received. Medication given for B/P. Will recheck B/P.  1800: pt B/P still elevated gave IV labetalol. Will reassess.

## 2019-07-11 NOTE — PROGRESS NOTES
TRANSFER - IN REPORT:    Verbal report received from [place] [id number] on Yao Noe  being received from the Emergency Dept. for routine progression of care      Report consisted of patients Situation, Background, Assessment and   Recommendations(SBAR). Information from the following report(s) SBAR, Kardex, ED Summary, Procedure Summary, Intake/Output, MAR, Recent Results and Cardiac Rhythm NSR was reviewed with the receiving nurse. Opportunity for questions and clarification was provided. Assessment completed upon patients arrival to unit and care assumed. 0130: Assumed patient care from [place] [id number]. Patient is alert and oriented to person, place, time and situation. Respiratory status is stable on room air. Vital signs are stable. MEWS score is a one. Patient denies any pain, discomfort, nausea vomiting dizziness or anxiety. White board and fall card is updated. Bed is locked and in lowest position. Call bell, water and personal belongings are within reach. Patient has no questions, comments or concerns after bedside shift report. 0700: Patient had an uneventful shift. Respiratory status, vital signs and MEWS score remained stable. Patient was resting quietly with no signs of distress noted. Bed locked and in lowest position. Call bell water and personal belongings were within reach. Patient had no questions, comments or concerns after bedside shift report.  Bedside report given to [facility] R.NRobert

## 2019-07-11 NOTE — PROGRESS NOTES
Problem: Dysphagia (Adult)  Goal: *Acute Goals and Plan of Care (Insert Text)  Description  Patient will:  1. Tolerate regular diet with thin liquids without overt s/sx of aspiration in 4/5 trials under SLP supervision. - MET 7/11/19  2. Utilize compensatory swallow strategies of small bite/sip, alternate liquid/solid with min cues in 4/5 trials. - MET 7/11/19      Rec:   regular diet, thin liquids  HOB >45 during po intake, remain >30 for 30-45 minutes after po   Small bites/sips; alternate liquid/solid, slow feeding rate   Oral care TID  Meds per pt preference    Outcome: Resolved/Met      [facility]    Patient: Elsie Thompson (4 y.o. male)  Date: 7/11/2019  Primary Diagnosis: Hypertensive crisis [I16.9]  Dizziness [R42]        Precautions:      PLOF: regular/thin    ASSESSMENT :  Pt seen for swallow eval. Pt AOx4, accepting of eval. Pt and family at bedside deny difficulty swallowing, reporting regular diet prior to current admission. RN reports pt tolerating current diet. Oral motor structures, strength, and ROM WFL; grossly intact for mastication and deglutition. Functional communication. Intelligibility >90%. Cognitive-linguistic function appears intact. Pt self-feeding PO trials of thin liquid via cup/straw and tandem drinking, puree, mixed, and regular textures. No s/sx of aspiration appreciated across consistencies. Oropharyngeal swallow appears WFL. Swallow appears timely, adequate laryngeal elevation noted via palpation, no change in vocal/resp quality appreciated via cervical auscultation. Recommend continue regular texture diet with thin liquids, meds per pt preference. 0 formal ST needs for dysphagia indicated at this time. Pt and family at bedside educated on and verbalized understanding of findings, recs, and POC; d/w RN, Eren Rasmussen. SLP will sign off at this time. Please re-consult should further concerns arise.         PLAN :  Recommendations and Planned Interventions:  No formal ST needs ID'd for dysphagia. Eval only. Discharge Recommendations: None     SUBJECTIVE:   Patient stated ? No, I've never had troubles? .    OBJECTIVE:     Past Medical History:   Diagnosis Date    HTN (hypertension)    History reviewed. No pertinent surgical history. Home Situation:   Home Situation  Home Environment: Apartment  One/Two Story Residence: Two story  Living Alone: Yes  Support Systems: Family member(s)  Patient Expects to be Discharged to[de-identified] Apartment  Current DME Used/Available at Home: None    Diet prior to admission: regular/thin  Current Diet:  regular/thin     Cognitive and Communication Status:  Neurologic State: Alert  Orientation Level: Oriented X4  Cognition: Appropriate decision making, Appropriate for age attention/concentration, Appropriate safety awareness, Follows commands  Perception: Appears intact  Perseveration: No perseveration noted  Safety/Judgement: Good awareness of safety precautions, Insight into deficits  Oral Assessment:  Oral Assessment  Labial: No impairment  Dentition: Natural;Intact  Oral Hygiene: Adequate  Lingual: No impairment  Velum: No impairment  Mandible: No impairment  P.O. Trials:  Patient Position: 61* HOB  Vocal quality prior to P.O.: No impairment  Consistency Presented: Thin liquid;Puree; Solid;Mixed consistency  How Presented: Self-fed/presented;Cup/sip;Straw;Successive swallows;Spoon     Bolus Acceptance: No impairment  Bolus Formation/Control: No impairment     Propulsion: No impairment  Oral Residue: None  Initiation of Swallow: No impairment  Laryngeal Elevation: Functional  Aspiration Signs/Symptoms: None  Pharyngeal Phase Characteristics: No impairment, issues, or problems   Effective Modifications: None  Cues for Modifications: None       Oral Phase Severity: No impairment  Pharyngeal Phase Severity : No impairment    PAIN:  Pain level pre-treatment: 0/10   Pain level post-treatment: 0/10   Pain Intervention(s): NA   Response to intervention: NA    After evaluation:   ?            Patient left in no apparent distress sitting up in chair  ? Patient left in no apparent distress in bed  ? Call bell left within reach  ? Nursing notified  ? Family present  ? Caregiver present  ? Bed alarm activated      COMMUNICATION/EDUCATION:   ?            Aspiration precautions; swallow safety; compensatory techniques. ?            Patient/family have participated as able in goal setting and plan of care. ?            Patient/family agree to work toward stated goals and plan of care. ?            Patient understands intent and goals of therapy; neutral about participation. ? Patient unable to participate in goal setting/plan of care; educ ongoing with interdisciplinary staff  ? Posted safety precautions in patient's room.     Thank you for this referral.    Mia Martel M.S., CCC-SLP  Speech-Language Pathologist    Time Calculation: 11 mins

## 2019-07-11 NOTE — PROGRESS NOTES
0730 Assumed care of patient from [address] [facility] Arturo Martinez Sit. 0750 Pt leaving unit for scheduled procedures.

## 2019-07-11 NOTE — PROGRESS NOTES
7/11/2019 PT note: consult received and chart reviewed with hypertensive episode noted 165/102 and Dr Segundo Jhaveri having been contacted. Will f/u tomorrow for PT evaluation as appropriate. Thank you.    David Khan, PT

## 2019-07-11 NOTE — H&P
History & Physical    Patient: Shivam Fernandez MRN: [id number]  CSN: [id number]    YOB: 1975  Age: 41 y.o. Sex: male      DOA: 7/10/2019    Chief Complaint:   Chief Complaint   Patient presents with    Dizziness    Vomiting    Hypertension          HPI:     Shivam Fernandez is a 41 y.o.  male who has hx of HTN presents to ER with complaints of Vertigo, dizziness that started early this am. Patient symptoms worsened with lying down and he had several episodes of emesis; Patient felt like room was spinning with walking and lying down. Dizziness was so severe he could not drive in ER found to have BP over 22-/120  Given Labetalol IV with rapid improvement  CT head done no hemorrhage;  Patient states dizziness was associated with tingling and numbness on right side of body hands and feet  Asked to admit for HTN and Vertigo/  Note patient was diagnosed with HTN few years ago  But did not get follow up and treated with diet and exercise     Past Medical History:   Diagnosis Date    HTN (hypertension)        History reviewed. No pertinent surgical history. History reviewed. No pertinent family history. Social History     Socioeconomic History    Marital status: SINGLE     Spouse name: Not on file    Number of children: Not on file    Years of education: Not on file    Highest education level: Not on file   Tobacco Use    Smoking status: Current Every Day Smoker     Packs/day: 0.25    Smokeless tobacco: Never Used   Substance and Sexual Activity    Alcohol use: Yes     Alcohol/week: 0.6 oz     Types: 1 Cans of beer per week     Frequency: Never    Drug use: Never       Prior to Admission medications    Not on File       No Known Allergies      Review of Systems  GENERAL: Patient alert, awake and oriented times 3, able to communicate full sentences and not in distress. HEENT: No change in vision, no earache, tinnitus, sore throat or sinus congestion.    NECK: No pain or stiffness. PULMONARY: No shortness of breath, cough or wheeze. Cardiovascular: no pnd or orthopnea, no CP  GASTROINTESTINAL: No abdominal pain, nausea, vomiting or diarrhea, melena or bright red blood per rectum. GENITOURINARY: No urinary frequency, urgency, hesitancy or dysuria. MUSCULOSKELETAL: No joint or muscle pain, no back pain, no recent trauma. DERMATOLOGIC: No rash, no itching, no lesions. ENDOCRINE: No polyuria, polydipsia, no heat or cold intolerance. No recent change in weight. HEMATOLOGICAL: No anemia or easy bruising or bleeding. NEUROLOGIC: +headache, seizures, +numbness,+ tingling or weakness. Recent Results (from the past 24 hour(s))   EKG, 12 LEAD, INITIAL    Collection Time: 07/10/19  6:55 PM   Result Value Ref Range    Ventricular Rate 61 BPM    Atrial Rate 61 BPM    P-R Interval 214 ms    QRS Duration 90 ms    Q-T Interval 460 ms    QTC Calculation (Bezet) 463 ms    Calculated P Axis 23 degrees    Calculated R Axis 59 degrees    Calculated T Axis -69 degrees    Diagnosis       Sinus rhythm with 1st degree AV block  Left ventricular hypertrophy  Anterior infarct , age undetermined  T wave abnormality, consider inferolateral ischemia  Abnormal ECG  Confirmed by Drea Navarro MD, [facility] ([id number]) on 7/10/2019 8:35:34 PM     CBC WITH AUTOMATED DIFF    Collection Time: 07/10/19  7:05 PM   Result Value Ref Range    WBC 12.6 4.6 - 13.2 K/uL    RBC 5.66 (H) 4.70 - 5.50 M/uL    HGB 15.5 13.0 - 16.0 g/dL    HCT 47.2 36.0 - 48.0 %    MCV 83.4 74.0 - 97.0 FL    MCH 27.4 24.0 - 34.0 PG    MCHC 32.8 31.0 - 37.0 g/dL    RDW 14.0 11.6 - 14.5 %    PLATELET 386 118 - 681 K/uL    MPV 9.3 9.2 - 11.8 FL    NEUTROPHILS 87 (H) 40 - 73 %    LYMPHOCYTES 9 (L) 21 - 52 %    MONOCYTES 4 3 - 10 %    EOSINOPHILS 0 0 - 5 %    BASOPHILS 0 0 - 2 %    ABS. NEUTROPHILS 10.9 (H) 1.8 - 8.0 K/UL    ABS. LYMPHOCYTES 1.2 0.9 - 3.6 K/UL    ABS. MONOCYTES 0.5 0.05 - 1.2 K/UL    ABS. EOSINOPHILS 0.0 0.0 - 0.4 K/UL    ABS. BASOPHILS 0.0 0.0 - 0.1 K/UL    DF AUTOMATED     METABOLIC PANEL, COMPREHENSIVE    Collection Time: 07/10/19  7:05 PM   Result Value Ref Range    Sodium 139 136 - 145 mmol/L    Potassium 4.0 3.5 - 5.5 mmol/L    Chloride 103 100 - 108 mmol/L    CO2 28 21 - 32 mmol/L    Anion gap 8 3.0 - 18 mmol/L    Glucose 150 (H) 74 - 99 mg/dL    BUN 11 7.0 - 18 MG/DL    Creatinine 1.02 0.6 - 1.3 MG/DL    BUN/Creatinine ratio 11 (L) 12 - 20      GFR est AA >60 >60 ml/min/1.73m2    GFR est non-AA >60 >60 ml/min/1.73m2    Calcium 9.1 8.5 - 10.1 MG/DL    Bilirubin, total 0.4 0.2 - 1.0 MG/DL    ALT (SGPT) 30 16 - 61 U/L    AST (SGOT) 20 15 - 37 U/L    Alk. phosphatase 112 45 - 117 U/L    Protein, total 8.0 6.4 - 8.2 g/dL    Albumin 3.9 3.4 - 5.0 g/dL    Globulin 4.1 (H) 2.0 - 4.0 g/dL    A-G Ratio 1.0 0.8 - 1.7     CARDIAC PANEL,(CK, CKMB & TROPONIN)    Collection Time: 07/10/19  7:05 PM   Result Value Ref Range     39 - 308 U/L    CK - MB 2.0 <3.6 ng/ml    CK-MB Index 1.6 0.0 - 4.0 %    Troponin-I, QT <0.02 0.0 - 0.045 NG/ML     Physical Exam:     Physical Exam:  Visit Vitals  BP (!) 171/97   Pulse 62   Temp 99.3 °F (37.4 °C)   Resp 19   Ht 5' 10\" (1.778 m)   Wt 90.7 kg (200 lb)   SpO2 99%   BMI 28.70 kg/m²      O2 Device: Room air    Temp (24hrs), Av.5 °F (36.9 °C), Min:97.7 °F (36.5 °C), Max:99.3 °F (37.4 °C)    07/10 1901 -  0700  In: 1000 [I.V.:1000]  Out: -    No intake/output data recorded. General:  Alert, cooperative, no distress, appears stated age. Head: Normocephalic, without obvious abnormality, atraumatic. Eyes:  Conjunctivae/corneas clear. PERRL, EOMs intact. ? Slow eye on right    Nose: Nares normal. No drainage or sinus tenderness. Neck: Supple, symmetrical, trachea midline, no adenopathy, thyroid: no enlargement, no carotid bruit and no JVD. Lungs:   Clear to auscultation bilaterally. Heart:  Regular rate and rhythm, S1, S2 normal. s4 positive      Abdomen: Soft, non-tender.  Bowel sounds normal.    Extremities: Extremities normal, atraumatic, no cyanosis or edema. Pulses: 2+ and symmetric all extremities. Skin:  No rashes or lesions   Neurologic: AAOx3, No focal motor or sensory deficit. finger to nose normal   Heel to shin normal pinprick intact   ? Lazy eye right      CT Results  (Last 48 hours)               07/10/19 2010  CT HEAD WO CONT Final result    Impression:  IMPRESSION:       No acute intracranial abnormalities. Narrative:  EXAM: CT head       INDICATION: Unsteady gait       COMPARISON: None. TECHNIQUE: Axial CT imaging of the head was performed without intravenous   contrast. One or more dose reduction techniques were used on this CT: automated   exposure control, adjustment of the mAs and/or kVp according to patient size,   and iterative reconstruction techniques. The specific techniques used on this   CT exam have been documented in the patient's electronic medical record. Digital   Imaging and Communications in Medicine (DICOM) format image data are available   to nonaffiliated external healthcare facilities or entities on a secure, media   free, reciprocally searchable basis with patient authorization for at least a   12-month period after this study. _______________       FINDINGS:       BRAIN AND POSTERIOR FOSSA: The sulci, folia, ventricles and basal cisterns are   within normal limits for the patient's age. There is no intracranial hemorrhage,   mass effect, or midline shift. There are no areas of abnormal parenchymal   attenuation. EXTRA-AXIAL SPACES AND MENINGES: There are no abnormal extra-axial fluid   collections. CALVARIUM: Intact. SINUSES: Clear. OTHER: None.       _______________                 Labs Reviewed: All lab results for the last 24 hours reviewed.   CXR,  and EKG    Procedures/imaging: see electronic medical records for all procedures/Xrays and details which were not copied into this note but were reviewed prior to creation of Plan      Assessment/Plan     Active Problems:    Hypertensive crisis (7/11/2019)  Start Labetalol   If MRI negative add amlodipine  Check echo   Urine catechol/ultrasound      Dizziness (7/11/2019)  Fluids  Meclizine prn   BP control  Zofran for nausea      Vomiting    ? TIA  Check MRI  CTA head neck  Slow BP control until MRI returns     Hyperglycemia  Check a1c   acu check   Fluids insulin prn     Tobacco abuse  Advised to quit          DVT/GI Prophylaxis: Hep SQ    Discussed with patient at bedside about hospital admission and my plan care, who understood and agree with my plan care.     Quique Maddox MD  7/11/2019 11:02 PM

## 2019-07-11 NOTE — PROGRESS NOTES
OT note: Chart reviewed. Pt has high blood pressure at this time for the past few hours 160s/100s. Will re-attempt eval later when pt medically appropriate.  Thank you Yaneth Donovan OTR/L

## 2019-07-11 NOTE — PROGRESS NOTES
Bedside and Verbal shift change report given to ROBERT Hicks (oncoming nurse) by Marixa (offgoing nurse). Report included the following information SBAR, Kardex, Intake/Output, MAR and Recent Results.

## 2019-07-11 NOTE — ED NOTES
Pt hourly rounding competed. Safety   Pt (X) resting on stretcher with side rail up and call bell in reach. () in chair    () in parents arms. Toileting   Pt offered ()Bedpan     ()Assistance to Restroom     ()Urinal  Ongoing Updates  Updated on plan of care and status of test results.   Pain Management  Inquired as to comfort and offered comfort measures:    () warm blankets   (X) dimmed lights

## 2019-07-11 NOTE — PROGRESS NOTES
SLP NOTE -    921: Swallow eval orders received and chart review completed. Swallow eval completed. Pt safe to continue regular diet with thin liquids, full note to follow.     Thank you for this referral.    Real Llanes M.S., [id number] [facility]  Speech-Language Pathologist

## 2019-07-11 NOTE — PROGRESS NOTES
Reason for Admission:   Erika Norton is a 41 y.o. male with no significant PMHX  who presents to the emergency department C/O dizziness. Patient states he generally has not felt well all week. He however this morning was laying in bed until about 1 PM when he tried to get out of bed and felt dizzy. He describes the dizziness is spinning, this was associated with nausea vomiting for about 4 hours. He states he has right lazy eye, otherwise no visual problems. He states the nausea vomiting is improved since he arrived in the ED but the dizziness is changed now feeling off balance when he tries to stand. He has no numbness tingling or weakness in the in of his extremities. Patient denies any history of hypertension, he does not smoke, no history of diabetes or hypercholesterolemia, he however states his mother had stroke same age as he is. Patient admitted for medical management of HTN crisis     PCP: Greg Suárez MD                        RRAT Score:        5             Plan for utilizing home health:                          Current Advanced Directive/Advance Care Plan:                          Transition of Care Plan:      Patient is self pay      Chart reviewed only due to patient remains off unit for testing  Cm met with Dr. Segundo Jhaveri patient does not have pcp will need [facility] clinic for follow up. [facility] has screened patient and he is over resource for medicaid.  Cm will continue to follow

## 2019-07-11 NOTE — PROGRESS NOTES
1125 Verbal orders received from Dr Ruth Cordon to discontinue Neuro checks per stroke protocol. Pt did not have a stroke.

## 2019-07-11 NOTE — ED NOTES
TRANSFER - OUT REPORT:    Verbal report given to Brant Sanchez RN(name) on Kyle Rodriguez  being transferred to (unit) for routine progression of care       Report consisted of patients Situation, Background, Assessment and   Recommendations(SBAR). Information from the following report(s) SBAR, ED Summary and MAR was reviewed with the receiving nurse. Lines:   Peripheral IV 07/10/19 Right Antecubital (Active)   Site Assessment Clean, dry, & intact 7/10/2019  7:05 PM   Phlebitis Assessment 0 7/10/2019  7:05 PM   Infiltration Assessment 0 7/10/2019  7:05 PM   Dressing Status Clean, dry, & intact 7/10/2019  7:05 PM   Dressing Type Tape;Transparent 7/10/2019  7:05 PM   Hub Color/Line Status Pink;Flushed 7/10/2019  7:05 PM   Action Taken Blood drawn 7/10/2019  7:05 PM        Opportunity for questions and clarification was provided.       Patient transported with:   Monitor  Tech

## 2019-07-11 NOTE — PROGRESS NOTES
Hospitalist Progress Note    Patient: Shivam Fernandez MRN: [id number]  CSN: [id number]    YOB: 1975  Age: 41 y.o. Sex: male    DOA: 7/10/2019 LOS:  LOS: 0 days          Chief Complaint:    Dizziness, high BP      Assessment/Plan     Hypertensive urgency  Dizziness  Type 2 NIDDM    Start norvasc as MRI brain neg for infarction  Change IVF to 1/2 NS as BP still elevated    Today plan CTA head and neck, renal US, and echocardiogram    DVT proph-heparin  Cardiac diet  Monitor on telemetry    Needs close outpatient follow up arranged  Follow Hgb A1C level also    Expect 24 hrs more in hospital      Patient Active Problem List   Diagnosis Code    Hypertensive urgency I16.0    Dizziness R42    Type 2 diabetes mellitus with hyperglycemia, without long-term current use of insulin (HCC) E11.65       Subjective:    He still has vertigo type symptoms this am  Dizzy with movement  But eating [employer] breakfast and in NAD  Denies HA, nausea    Review of systems:    Respiratory: denies SOB  Cardiovascular: denies chest pain, palpitations  Gastrointestinal: denies nausea, vomiting      Vital signs/Intake and Output:  Visit Vitals  BP (!) 177/94 (BP 1 Location: Right arm, BP Patient Position: At rest;Supine)   Pulse 78   Temp 98 °F (36.7 °C)   Resp 16   Ht 5' 10\" (1.778 m)   Wt 91.2 kg (201 lb 1 oz)   SpO2 100%   BMI 28.85 kg/m²     Current Shift:  No intake/output data recorded.   Last three shifts:  07/09 1901 - 07/11 0700  In: 1000 [I.V.:1000]  Out: 0     Exam:    General: Well developed, alert, NAD, OX3  CVS:Regular rate and rhythm, no M/R/G, S1/S2 heard, no thrill  Lungs:Clear to auscultation bilaterally, no wheezes, rhonchi, or rales  Abdomen: Soft, Nontender, No distention, Normal Bowel sounds, No hepatomegaly  Extremities: No C/C/E, pulses palpable 2+  Neuro:grossly normal , follows commands  Psych:appropriate                Labs: Results:       Chemistry Recent Labs     07/10/19  1905   *   NA 139   K 4.0      CO2 28   BUN 11   CREA 1.02   CA 9.1   AGAP 8   BUCR 11*      TP 8.0   ALB 3.9   GLOB 4.1*   AGRAT 1.0      CBC w/Diff Recent Labs     07/10/19  1905   WBC 12.6   RBC 5.66*   HGB 15.5   HCT 47.2      GRANS 87*   LYMPH 9*   EOS 0      Cardiac Enzymes Recent Labs     07/11/19  0218 07/10/19  1905    125   CKND1 1.5 1.6      Coagulation No results for input(s): PTP, INR, APTT in the last 72 hours. No lab exists for component: INREXT    Lipid Panel No results found for: CHOL, CHOLPOCT, CHOLX, CHLST, CHOLV, 405562, HDL, LDL, LDLC, DLDLP, 973136, VLDLC, VLDL, TGLX, TRIGL, TRIGP, TGLPOCT, CHHD, CHHDX   BNP No results for input(s): BNPP in the last 72 hours.    Liver Enzymes Recent Labs     07/10/19  1905   TP 8.0   ALB 3.9      SGOT 20      Thyroid Studies No results found for: T4, T3U, TSH, TSHEXT     Procedures/imaging: see electronic medical records for all procedures/Xrays and details which were not copied into this note but were reviewed prior to creation of Papo Farrell MD

## 2019-07-12 VITALS
WEIGHT: 205.47 LBS | RESPIRATION RATE: 16 BRPM | HEIGHT: 70 IN | DIASTOLIC BLOOD PRESSURE: 79 MMHG | TEMPERATURE: 98 F | BODY MASS INDEX: 29.42 KG/M2 | SYSTOLIC BLOOD PRESSURE: 153 MMHG | OXYGEN SATURATION: 100 % | HEART RATE: 69 BPM

## 2019-07-12 LAB
CHOLEST SERPL-MCNC: 179 MG/DL
COLLECT DURATION TIME UR: 24 HR
CREAT 24H UR-MRATE: 2430 MG/24HR (ref 600–2500)
GLUCOSE BLD STRIP.AUTO-MCNC: 91 MG/DL (ref 70–110)
HDLC SERPL-MCNC: 47 MG/DL (ref 40–60)
HDLC SERPL: 3.8 {RATIO} (ref 0–5)
LDLC SERPL CALC-MCNC: 110.8 MG/DL (ref 0–100)
LIPID PROFILE,FLP: ABNORMAL
SPECIMEN VOL ?TM UR: 2700 ML
TRIGL SERPL-MCNC: 106 MG/DL (ref ?–150)
VLDLC SERPL CALC-MCNC: 21.2 MG/DL

## 2019-07-12 PROCEDURE — 74011250637 HC RX REV CODE- 250/637: Performed by: HOSPITALIST

## 2019-07-12 PROCEDURE — 80061 LIPID PANEL: CPT

## 2019-07-12 PROCEDURE — 74011250637 HC RX REV CODE- 250/637: Performed by: INTERNAL MEDICINE

## 2019-07-12 PROCEDURE — 36415 COLL VENOUS BLD VENIPUNCTURE: CPT

## 2019-07-12 PROCEDURE — 74011250636 HC RX REV CODE- 250/636: Performed by: INTERNAL MEDICINE

## 2019-07-12 PROCEDURE — 82384 ASSAY THREE CATECHOLAMINES: CPT

## 2019-07-12 PROCEDURE — 82570 ASSAY OF URINE CREATININE: CPT

## 2019-07-12 PROCEDURE — 82962 GLUCOSE BLOOD TEST: CPT

## 2019-07-12 PROCEDURE — 99218 HC RM OBSERVATION: CPT

## 2019-07-12 RX ORDER — HYDRALAZINE HYDROCHLORIDE 10 MG/1
10 TABLET, FILM COATED ORAL 3 TIMES DAILY
Qty: 90 TAB | Refills: 1 | Status: SHIPPED | OUTPATIENT
Start: 2019-07-12 | End: 2019-07-12

## 2019-07-12 RX ORDER — LOSARTAN POTASSIUM 50 MG/1
25 TABLET ORAL DAILY
Qty: 30 TAB | Refills: 1 | Status: SHIPPED | OUTPATIENT
Start: 2019-07-12

## 2019-07-12 RX ORDER — HYDRALAZINE HYDROCHLORIDE 10 MG/1
10 TABLET, FILM COATED ORAL 3 TIMES DAILY
Status: DISCONTINUED | OUTPATIENT
Start: 2019-07-12 | End: 2019-07-12

## 2019-07-12 RX ORDER — HYDRALAZINE HYDROCHLORIDE 25 MG/1
25 TABLET, FILM COATED ORAL 3 TIMES DAILY
Qty: 90 TAB | Refills: 1 | Status: SHIPPED | OUTPATIENT
Start: 2019-07-12 | End: 2019-07-12

## 2019-07-12 RX ORDER — GUAIFENESIN 100 MG/5ML
81 LIQUID (ML) ORAL DAILY
Qty: 30 TAB | Refills: 1 | Status: SHIPPED | OUTPATIENT
Start: 2019-07-13

## 2019-07-12 RX ORDER — HYDROCHLOROTHIAZIDE 25 MG/1
25 TABLET ORAL DAILY
Qty: 30 TAB | Refills: 0 | Status: SHIPPED | OUTPATIENT
Start: 2019-07-12

## 2019-07-12 RX ORDER — AMLODIPINE BESYLATE 10 MG/1
10 TABLET ORAL DAILY
Qty: 30 TAB | Refills: 1 | Status: SHIPPED | OUTPATIENT
Start: 2019-07-13

## 2019-07-12 RX ORDER — HYDRALAZINE HYDROCHLORIDE 25 MG/1
25 TABLET, FILM COATED ORAL 3 TIMES DAILY
Status: DISCONTINUED | OUTPATIENT
Start: 2019-07-12 | End: 2019-07-12 | Stop reason: HOSPADM

## 2019-07-12 RX ADMIN — HEPARIN SODIUM 5000 UNITS: 5000 INJECTION INTRAVENOUS; SUBCUTANEOUS at 05:00

## 2019-07-12 RX ADMIN — HYDRALAZINE HYDROCHLORIDE 10 MG: 10 TABLET, FILM COATED ORAL at 08:25

## 2019-07-12 RX ADMIN — HYDROCHLOROTHIAZIDE: 12.5 CAPSULE ORAL at 08:24

## 2019-07-12 RX ADMIN — ASPIRIN 81 MG 81 MG: 81 TABLET ORAL at 08:25

## 2019-07-12 RX ADMIN — AMLODIPINE BESYLATE 10 MG: 5 TABLET ORAL at 08:25

## 2019-07-12 NOTE — PROGRESS NOTES
Problem: Hypertension  Goal: *Blood pressure within specified parameters  Outcome: Progressing Towards Goal  Goal: *Fluid volume balance  Outcome: Progressing Towards Goal  Goal: *Labs within defined limits  Outcome: Progressing Towards Goal     Problem: Patient Education: Go to Patient Education Activity  Goal: Patient/Family Education  Outcome: Progressing Towards Goal     Problem: Falls - Risk of  Goal: *Absence of Falls  Description  Document Manohar Baker Fall Risk and appropriate interventions in the flowsheet.   Outcome: Progressing Towards Goal     Problem: Patient Education: Go to Patient Education Activity  Goal: Patient/Family Education  Outcome: Progressing Towards Goal     Problem: Pain  Goal: *Control of Pain  Outcome: Progressing Towards Goal     Problem: Patient Education: Go to Patient Education Activity  Goal: Patient/Family Education  Outcome: Progressing Towards Goal     Problem: Patient Education: Go to Patient Education Activity  Goal: Patient/Family Education  Outcome: Progressing Towards Goal

## 2019-07-12 NOTE — PROGRESS NOTES
Transition of care: d/c home today  Patient lives with s/o he has no insurance. He has been screened by [employer] and is over resource. Patient would like follow up with [facility]. Cms is aware and will call patient with follow up appointment. Lamar Sweeney He is IADL's. 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

## 2019-07-12 NOTE — DISCHARGE INSTRUCTIONS
Patient Education        Acute High Blood Pressure: Care Instructions  Your Care Instructions    Acute high blood pressure is very high blood pressure. It's a serious problem. Very high blood pressure can damage your brain, heart, eyes, and kidneys. You may have been given medicines to lower your blood pressure. You may have gotten them as pills or through a needle in one of your veins. This is called an IV. And maybe you were given other medicines too. These can be needed when high blood pressure causes other problems. To keep your blood pressure at a lower level, you may need to make healthy lifestyle changes. And you will probably need to take medicines. Be sure to follow up with your doctor about your blood pressure and what you can do about it. Follow-up care is a key part of your treatment and safety. Be sure to make and go to all appointments, and call your doctor if you are having problems. It's also a good idea to know your test results and keep a list of the medicines you take. How can you care for yourself at home? · See your doctor as often as he or she recommends. This is to make sure your blood pressure is under control. You will probably go at least 2 times a year. But it may be more often at first.  · Take your blood pressure medicine exactly as prescribed. You may take one or more types. They include diuretics, beta-blockers, ACE inhibitors, calcium channel blockers, and angiotensin II receptor blockers. Call your doctor if you think you are having a problem with your medicine. · If you take blood pressure medicine, talk to your doctor before you take decongestants or anti-inflammatory medicine, such as ibuprofen. These can raise blood pressure. · Learn how to check your blood pressure at home. Check it often. · Ask your doctor if it's okay to drink alcohol. · Talk to your doctor about lifestyle changes that can help blood pressure. These include being active and not smoking.   When should you call for help? Call 911 anytime you think you may need emergency care. This may mean having symptoms that suggest that your blood pressure is causing a serious heart or blood vessel problem. Your blood pressure may be over 180/120.   For example, call 911 if:    · You have symptoms of a heart attack. These may include:  ? Chest pain or pressure, or a strange feeling in the chest.  ? Sweating. ? Shortness of breath. ? Nausea or vomiting. ? Pain, pressure, or a strange feeling in the back, neck, jaw, or upper belly or in one or both shoulders or arms. ? Lightheadedness or sudden weakness. ? A fast or irregular heartbeat.     · You have symptoms of a stroke. These may include:  ? Sudden numbness, tingling, weakness, or loss of movement in your face, arm, or leg, especially on only one side of your body. ? Sudden vision changes. ? Sudden trouble speaking. ? Sudden confusion or trouble understanding simple statements. ? Sudden problems with walking or balance. ? A sudden, severe headache that is different from past headaches.     · You have severe back or belly pain.    Do not wait until your blood pressure comes down on its own. Get help right away.   Call your doctor now or seek immediate care if:    · Your blood pressure is much higher than normal (such as 180/120 or higher), but you don't have symptoms.     · You think high blood pressure is causing symptoms, such as:  ? Severe headache.  ? Blurry vision.    Watch closely for changes in your health, and be sure to contact your doctor if:    · Your blood pressure measures higher than your doctor recommends at least 2 times. That means the top number is higher or the bottom number is higher, or both.     · You think you may be having side effects from your blood pressure medicine. Where can you learn more? Go to http://venus-luis eduardo.info/.   Enter T447 in the search box to learn more about \"Acute High Blood Pressure: Care Instructions. \"  Current as of: July 22, 2018  Content Version: 11.9  © 4056-1610 [employer], [facility]. Care instructions adapted under license by [employer] (which disclaims liability or warranty for this information). If you have questions about a medical condition or this instruction, always ask your healthcare professional. [place] [address] any warranty or liability for your use of this information.

## 2019-07-12 NOTE — PROGRESS NOTES
Shift uneventful. Patient resting in bed this morning with wife at bedside. Bedside and Verbal shift change report given to Charisma Damon RN (oncoming nurse) by Roland Marrero RN (offgoing nurse). Report included the following information SBAR, Kardex, Intake/Output, MAR, Accordion and Recent Results.

## 2019-07-12 NOTE — DISCHARGE SUMMARY
[address]    Name:  Una Younger  MR#:   [id number]  :  1975  ACCOUNT #:  [de-identified]  ADMIT DATE:  07/10/2019  DISCHARGE DATE:  2019      DISCHARGE DIAGNOSES:  1. Hypertensive urgency. 2.  Uncontrolled hypertension. 3.  Dizziness. HOSPITAL SUMMARY:  This is a 53-year-old [place] American male with a family history of hypertension as well as a past medical history of hypertension, but not being treated, came to the emergency room complaining of dizziness. He had had associated nausea and vomiting. His dizziness was so severe he could not drive. His blood pressure was noted to be significantly elevated in the emergency room. He was given IV labetalol with some improvement. They did a stat CT head that showed no hemorrhage. He was admitted for further treatment and evaluation. Subsequently, his blood work is unremarkable. CBC and chemistry were within normal limits. Cardiac markers times four sets including CK levels were all normal.  His total cholesterol is 179. His hemoglobin A1c is within normal range. His UDS was positive for THC. He had a CT angiogram of his head and neck that showed no definite large vessel occlusion. He had an MRI of his brain for the dizziness that showed no acute infarct, hemorrhage, or mass effect, with findings of Chiari I malformation asymmetric towards the right, but no abnormal brain stem or upper cervical cord pathology. He had an echocardiogram completed also that showed a normal EF 61-65% with moderate grade II left ventricular diastolic dysfunction. No evidence for pulmonary hypertension. There was no atrial septal defect present. He has felt better now that his blood pressure is being treated. He has been placed on an ARB medication, Norvasc, as well as hydralazine.   His blood pressure is still running high, last noted 184/116, prior to that it was 151/92, but he is feeling markedly better, the dizziness has resolved. He can get up and out of bed. He is feeling notably better from that standpoint. A 24-hour urine was sent to be collected, catecholamines have yet to be collected from the urine. He is hopeful he can go home today. He has been on a telemetry monitor here with no acute arrhythmias noted during the stay. His EKG on admission showed sinus rhythm with first-degree AV block. I think he is going to be a challenge to control as an outpatient for his blood pressure considering he likely have been hypertensive for some time. He was 241/118 when he came in, his numbers are actually improved since then. We will see how he does throughout the course of today, but expecting that he may be able to discharge later if his blood pressure is starting to come down more with the addition of these medications and he continues to feel well. With that said, he is going to need very close followup as an outpatient. I have  and advised him to either get his own primary care doctor or we will refer him to the [facility]. He prefers to see his own PCP that he knows about, but he cannot remember the person's name right now. Today his other vital signs are within normal limits, pulse 69, temperature 98, blood pressure most recently 184/116, respiratory rate 16, SaO2 100%. He is awake and alert, in no acute distress, nontoxic-appearing, well-developed black male, oriented x3. Lungs are clear bilaterally. Cardiac exam regular rate and rhythm. No murmur, rub, or gallop. Abdomen is soft, nontender. No pulsatile mass. Lower extremities, no clubbing, cyanosis, or edema. PLAN:  Monitor his blood pressure throughout the course of the day to the afternoon. I am going to increase his hydralazine to 25 mg three times daily. We added Norvasc yesterday, he started Hyzaar yesterday. Plan on discharging him is he leaves today on Norvasc 10 mg daily, aspirin 81 mg daily, hydralazine 25 mg three times daily.   The reason I am not adding a beta blocker is that he has a lower normal pulse rate in the interim. We are going to add HCTZ to his regimen as well. So he will actually have four medications including the calcium channel blocker, hydralazine, an ARB, and a diuretic, and close followup will be necessary. Expect discharge later this afternoon if his blood pressure is trending downward and close followup has been arranged.       Saeed Scott MD      [id number]  D:  07/12/2019 9:04  T:  07/12/2019 9:14  JOB #:  [id number]

## 2019-07-12 NOTE — PROGRESS NOTES
0700: Assumed care of pt from [address] [facility].  1000: Pt educated on B/p medication. And importance of medication taking and follow up appointments.

## 2019-07-18 LAB
COLLECT DURATION TIME UR: 24 HR
DOPAMINE 24H UR-MRATE: NORMAL UG/24 HR (ref 0–510)
DOPAMINE UR-MCNC: 224 UG/L
EPINEPH 24H UR-MRATE: NORMAL UG/24 HR (ref 0–20)
EPINEPH UR-MCNC: 3 UG/L
NOREPINEPH 24H UR-MRATE: NORMAL UG/24 HR (ref 0–135)
NOREPINEPH UR-MCNC: 37 UG/L
SPECIMEN VOL ?TM UR: 2700 ML

## 2023-11-06 ENCOUNTER — DOCUMENTATION ONLY (OUTPATIENT)
Dept: NEUROLOGY | Facility: CLINIC | Age: 48
End: 2023-11-06
Payer: COMMERCIAL

## 2023-11-06 NOTE — PROGRESS NOTES
Referral received from [facility]  HI Jameson MD's office for MS, referral has been faxed over to the referral team at [phone].  Aurelio Johnson EMT 11/06/2023 11:19AM

## 2023-11-07 ENCOUNTER — TRANSCRIBE ORDERS (OUTPATIENT)
Dept: OTHER | Age: 48
End: 2023-11-07

## 2023-11-07 DIAGNOSIS — G35 MS (MULTIPLE SCLEROSIS) (H): Primary | ICD-10-CM

## 2024-02-05 ENCOUNTER — LAB (OUTPATIENT)
Dept: LAB | Facility: CLINIC | Age: 49
End: 2024-02-05
Payer: COMMERCIAL

## 2024-02-05 ENCOUNTER — OFFICE VISIT (OUTPATIENT)
Dept: NEUROLOGY | Facility: CLINIC | Age: 49
End: 2024-02-05
Attending: PSYCHIATRY & NEUROLOGY
Payer: COMMERCIAL

## 2024-02-05 VITALS — SYSTOLIC BLOOD PRESSURE: 104 MMHG | HEART RATE: 49 BPM | DIASTOLIC BLOOD PRESSURE: 53 MMHG

## 2024-02-05 DIAGNOSIS — G35 MS (MULTIPLE SCLEROSIS) (H): Primary | ICD-10-CM

## 2024-02-05 DIAGNOSIS — G35 MS (MULTIPLE SCLEROSIS) (H): ICD-10-CM

## 2024-02-05 DIAGNOSIS — F03.90 MAJOR NEUROCOGNITIVE DISORDER (H): ICD-10-CM

## 2024-02-05 DIAGNOSIS — Z51.81 THERAPEUTIC DRUG MONITORING: ICD-10-CM

## 2024-02-05 LAB
BASOPHILS # BLD AUTO: 0.1 10E3/UL (ref 0–0.2)
BASOPHILS NFR BLD AUTO: 1 %
EOSINOPHIL # BLD AUTO: 0.2 10E3/UL (ref 0–0.7)
EOSINOPHIL NFR BLD AUTO: 3 %
ERYTHROCYTE [DISTWIDTH] IN BLOOD BY AUTOMATED COUNT: 13.9 % (ref 10–15)
HBV CORE AB SERPL QL IA: NONREACTIVE
HBV SURFACE AB SERPL IA-ACNC: <3.5 M[IU]/ML
HBV SURFACE AB SERPL IA-ACNC: NONREACTIVE M[IU]/ML
HBV SURFACE AG SERPL QL IA: NONREACTIVE
HCT VFR BLD AUTO: 42.9 % (ref 40–53)
HGB BLD-MCNC: 13.9 G/DL (ref 13.3–17.7)
IMM GRANULOCYTES # BLD: 0 10E3/UL
IMM GRANULOCYTES NFR BLD: 0 %
LYMPHOCYTES # BLD AUTO: 1.5 10E3/UL (ref 0.8–5.3)
LYMPHOCYTES NFR BLD AUTO: 17 %
MCH RBC QN AUTO: 27.4 PG (ref 26.5–33)
MCHC RBC AUTO-ENTMCNC: 32.4 G/DL (ref 31.5–36.5)
MCV RBC AUTO: 85 FL (ref 78–100)
MONOCYTES # BLD AUTO: 0.6 10E3/UL (ref 0–1.3)
MONOCYTES NFR BLD AUTO: 7 %
NEUTROPHILS # BLD AUTO: 6.8 10E3/UL (ref 1.6–8.3)
NEUTROPHILS NFR BLD AUTO: 72 %
NRBC # BLD AUTO: 0 10E3/UL
NRBC BLD AUTO-RTO: 0 /100
PLATELET # BLD AUTO: 297 10E3/UL (ref 150–450)
RBC # BLD AUTO: 5.07 10E6/UL (ref 4.4–5.9)
WBC # BLD AUTO: 9.2 10E3/UL (ref 4–11)

## 2024-02-05 PROCEDURE — 82784 ASSAY IGA/IGD/IGG/IGM EACH: CPT

## 2024-02-05 PROCEDURE — 87340 HEPATITIS B SURFACE AG IA: CPT

## 2024-02-05 PROCEDURE — 99205 OFFICE O/P NEW HI 60 MIN: CPT | Performed by: PSYCHIATRY & NEUROLOGY

## 2024-02-05 PROCEDURE — 86706 HEP B SURFACE ANTIBODY: CPT

## 2024-02-05 PROCEDURE — 86704 HEP B CORE ANTIBODY TOTAL: CPT

## 2024-02-05 PROCEDURE — 36415 COLL VENOUS BLD VENIPUNCTURE: CPT

## 2024-02-05 PROCEDURE — 86787 VARICELLA-ZOSTER ANTIBODY: CPT

## 2024-02-05 PROCEDURE — 85025 COMPLETE CBC W/AUTO DIFF WBC: CPT

## 2024-02-05 RX ORDER — TAMSULOSIN HYDROCHLORIDE 0.4 MG/1
1 CAPSULE ORAL DAILY
COMMUNITY
Start: 2022-09-23

## 2024-02-05 RX ORDER — PAROXETINE 20 MG/1
1 TABLET, FILM COATED ORAL EVERY MORNING
COMMUNITY
Start: 2022-06-14

## 2024-02-05 RX ORDER — ACETAMINOPHEN 500 MG
500 TABLET ORAL EVERY 6 HOURS PRN
COMMUNITY
End: 2024-03-08

## 2024-02-05 NOTE — NURSING NOTE
Chief Complaint   Patient presents with    MS     Referred by TERRANCE Larios on 2/5/2024 at 10:53 AM

## 2024-02-05 NOTE — PATIENT INSTRUCTIONS
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
## 2024-02-06 LAB
IGA SERPL-MCNC: 302 MG/DL (ref 84–499)
IGG SERPL-MCNC: 910 MG/DL (ref 610–1616)
IGM SERPL-MCNC: 85 MG/DL (ref 35–242)
VZV IGG SER QL IA: 2807 INDEX
VZV IGG SER QL IA: POSITIVE

## 2024-02-12 LAB — SCANNED LAB RESULT: ABNORMAL

## 2024-03-08 ENCOUNTER — VIRTUAL VISIT (OUTPATIENT)
Dept: NEUROLOGY | Facility: CLINIC | Age: 49
End: 2024-03-08
Attending: PSYCHIATRY & NEUROLOGY
Payer: COMMERCIAL

## 2024-03-08 ENCOUNTER — HOME INFUSION (PRE-WILLOW HOME INFUSION) (OUTPATIENT)
Dept: PHARMACY | Facility: CLINIC | Age: 49
End: 2024-03-08
Payer: COMMERCIAL

## 2024-03-08 ENCOUNTER — TELEPHONE (OUTPATIENT)
Dept: NEUROLOGY | Facility: CLINIC | Age: 49
End: 2024-03-08
Payer: COMMERCIAL

## 2024-03-08 DIAGNOSIS — G35 MS (MULTIPLE SCLEROSIS) (H): Primary | ICD-10-CM

## 2024-03-08 DIAGNOSIS — F41.9 ANXIETY: ICD-10-CM

## 2024-03-08 DIAGNOSIS — N40.0 BENIGN PROSTATIC HYPERPLASIA, UNSPECIFIED WHETHER LOWER URINARY TRACT SYMPTOMS PRESENT: ICD-10-CM

## 2024-03-08 PROCEDURE — 99207 PR NO BILLABLE SERVICE THIS VISIT: CPT | Mod: 93 | Performed by: PHARMACIST

## 2024-03-08 RX ORDER — ACETAMINOPHEN 325 MG/1
650 TABLET ORAL ONCE
Status: CANCELLED | OUTPATIENT
Start: 2024-03-11

## 2024-03-08 RX ORDER — DIPHENHYDRAMINE HYDROCHLORIDE 50 MG/ML
50 INJECTION INTRAMUSCULAR; INTRAVENOUS
Status: CANCELLED
Start: 2024-03-11

## 2024-03-08 RX ORDER — CHOLECALCIFEROL (VITAMIN D3) 50 MCG
1 TABLET ORAL DAILY
COMMUNITY

## 2024-03-08 RX ORDER — METHYLPREDNISOLONE SODIUM SUCCINATE 125 MG/2ML
125 INJECTION, POWDER, LYOPHILIZED, FOR SOLUTION INTRAMUSCULAR; INTRAVENOUS
Status: CANCELLED
Start: 2024-03-11

## 2024-03-08 RX ORDER — ALBUTEROL SULFATE 90 UG/1
1-2 AEROSOL, METERED RESPIRATORY (INHALATION)
Status: CANCELLED
Start: 2024-03-11

## 2024-03-08 RX ORDER — HEPARIN SODIUM (PORCINE) LOCK FLUSH IV SOLN 100 UNIT/ML 100 UNIT/ML
5 SOLUTION INTRAVENOUS
Status: CANCELLED | OUTPATIENT
Start: 2024-03-11

## 2024-03-08 RX ORDER — ALBUTEROL SULFATE 0.83 MG/ML
2.5 SOLUTION RESPIRATORY (INHALATION)
Status: CANCELLED | OUTPATIENT
Start: 2024-03-11

## 2024-03-08 RX ORDER — EPINEPHRINE 1 MG/ML
0.3 INJECTION, SOLUTION, CONCENTRATE INTRAVENOUS EVERY 5 MIN PRN
Status: CANCELLED | OUTPATIENT
Start: 2024-03-11

## 2024-03-08 RX ORDER — DIPHENHYDRAMINE HCL 25 MG
50 CAPSULE ORAL ONCE
Status: CANCELLED | OUTPATIENT
Start: 2024-03-11

## 2024-03-08 RX ORDER — OCRELIZUMAB 300 MG/10ML
600 INJECTION INTRAVENOUS
Status: SHIPPED
Start: 2024-03-08

## 2024-03-08 RX ORDER — HEPARIN SODIUM,PORCINE 10 UNIT/ML
5-20 VIAL (ML) INTRAVENOUS DAILY PRN
Status: CANCELLED | OUTPATIENT
Start: 2024-03-11

## 2024-03-08 RX ORDER — METHYLPREDNISOLONE SODIUM SUCCINATE 125 MG/2ML
125 INJECTION, POWDER, LYOPHILIZED, FOR SOLUTION INTRAMUSCULAR; INTRAVENOUS ONCE
Status: CANCELLED | OUTPATIENT
Start: 2024-03-11

## 2024-03-08 NOTE — PATIENT INSTRUCTIONS
"Recommendations from today's MTM visit:                                                    MTM (medication therapy management) is a service provided by a clinical pharmacist designed to help you get the most of out of your medicines.      We will be starting Ocrevus infusions. Day 1 you will receive 300 mg, day 15 you will receive another 300 mg and this will serve as your initial dose. Maintenance dosing will be 600 mg every 6 months.   [facility] Intake forms complete, ambulatory care prescription sent and therapy plan ordered. [facility] will be reaching out to you within 1-2 weeks. If you don't hear from them, please let me know.   We discussed vaccinations before and during treatment: Recommended getting non-live vaccines at least 2 weeks prior to first infusion. It is okay to get non-live vaccines while on the infusion, ideally about a month before your next scheduled infusion. If you have any questions or concerns on when to get vaccines, please reach out.   Here is a link for the financial assistance options offered for Ocrevus for review in case a patient assistance program is necessary: [email or web address]/patient/financial-support/assistance-options.html#patientfoundation     Follow-up: 6 months (before first maintenance dose)    It was great speaking with you today.  I value your experience and would be very thankful for your time in providing feedback in our clinic survey. In the next few days, you may receive an email or text message from [employer] with a link to a survey related to your  clinical pharmacist.\"     To schedule another MTM appointment, please call the clinic directly or you may call the MTM scheduling line at [phone].    My Clinical Pharmacist's contact information:                                                      Please feel free to contact me with any questions or concerns you have.      Bhargavi James, Pharm.D.  Medication Therapy Management " Pharmacist  [facility]

## 2024-03-08 NOTE — Clinical Note
3/8/2024       RE: Leandro Baires  [address] [facility] [address]     Dear Colleague,    Thank you for referring your patient, Leandro Baires, to the [facility] [place] at [facility]. Please see a copy of my visit note below.    Medication Therapy Management (MTM) Encounter    ASSESSMENT:                            Medication Adherence/Access: {adherencechoices:048342}    MS:  Patient would benefit from starting ***. Baseline labs reviewed. All patient's questions were answered. Education provided to the patient on medication dosing and potential side effects as well as the infusion process. Recommend completing non-live vaccinations about 2 weeks before first infusion. Patient educated on the need to avoid live-vaccinations and the recommendation to get all indicated non-live vaccines about 4 weeks prior to next infusion once established on therapy. Therapy plan created and [facility] intake forms completed***. Of note, Demerol removed from therapy plan,  *** to add if needed.        PLAN:                            ***    Follow-up: {followuptest2:375684}    SUBJECTIVE/OBJECTIVE:                          Leandro Baires is a 48 year old male { :468638} for {mtmvisit:741185}     Reason for visit: ***.    Allergies/ADRs: Reviewed in chart  Past Medical History: Reviewed in chart  Tobacco: He reports that he has been smoking cigarettes and vaping device. He has never used smokeless tobacco.Nicotine/Tobacco Cessation Plan  {Nicotine/Tobacco Cessation Plan:374480}  Alcohol: {ALCOHOL CONSUMPTION HX:114495}  {Social and Goals:212433}  Medication Adherence/Access: {fumedadherence:430609}    MS:   - Vitamin D?     Baseline screenings:   Hep B surface antibody non-reactive   Hep B surface antigen non-reactive  Hep B core antibody non-reactive  Immunoglobulins A, G, M completed  CLAUDIO Virus antibody positive   Varicella  "antibody positive   CBC with differential completed   Hepatic panel completed previously 8/11/23    Disease Modifying Therapy History:   - ***       Leandro - your blood work looks good. You have immunity to chicken pox/shingles. You do not have hepatitis b. Your blood count and protein levels are normal. 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 This only means that I would not recommend tysabri. Brooklynn Haile MD       {MTM SUBJECTIVE (Optional):583598}          Today's Vitals: There were no vitals taken for this visit.  ----------------  {ANGELITO?:162880}    I spent {[facility] total time 3:984562} with this patient today{MTMpartdbillingquestion:211492}. { :318827}. A copy of the visit note was provided to the patient's provider(s).    A summary of these recommendations {GIVEN/NOT GIVEN:807415}.    Bhargavi James, Pharm.D.  Medication Therapy Management Pharmacist  [facility]    Telemedicine Visit Details  Type of service:  {telemedvisitmtm:695151::\"Telephone visit\"}  Start Time: {video/phone visit start time:152948}  End Time: {video/phone visit end time:152948}     Medication Therapy Recommendations  No medication therapy recommendations to display     Medication Therapy Management (MTM) Encounter    ASSESSMENT:                            Medication Adherence/Access: No issues identified    MS:  Patient would benefit from starting Ocrevus. Baseline labs reviewed. All patient's questions were answered. Education provided to the patient on medication dosing and potential side effects as well as the infusion process. Recommend completing non-live vaccinations about 2 weeks before first infusion. Patient educated on the need to avoid live-vaccinations and the recommendation to get all indicated non-live vaccines about 4 weeks prior to next infusion once established on therapy. Therapy plan created and [facility] intake forms completed. Of note, Demerol removed from therapy plan, Dr. Haile to " add if needed.      Anxiety:   Patient planning to discuss with PCP about possible increase in dose of paroxetine. We discussed that paroxetine can sometimes worsen cognition/memory so they should monitor for this with increased dose of the medication and may consider switching to an alternative selective serotonin reuptake inhibitor if worsening cognition occurs.     BPH:  Stable     PLAN:                            We will be starting Ocrevus infusions. Day 1 you will receive 300 mg, day 15 you will receive another 300 mg and this will serve as your initial dose. Maintenance dosing will be 600 mg every 6 months.   [facility] Intake forms complete, ambulatory care prescription sent and therapy plan ordered. [facility] will be reaching out to you within 1-2 weeks. If you don't hear from them, please let me know.   We discussed vaccinations before and during treatment: Recommended getting non-live vaccines at least 2 weeks prior to first infusion. It is okay to get non-live vaccines while on the infusion, ideally about a month before your next scheduled infusion. If you have any questions or concerns on when to get vaccines, please reach out.   Here is a link for the financial assistance options offered for Ocrevus for review in case a patient assistance program is necessary: [email or web address]/patient/financial-support/assistance-options.html#patientfoundation     Follow-up: 6 months (before first maintenance dose)    SUBJECTIVE/OBJECTIVE:                          Leandro Baires is a 48 year old male called for an initial visit. He was referred to me from Dr. Haile. Patient was accompanied by wife, Jessica.     Reason for visit: Ocrevus coordination.    Allergies/ADRs: Reviewed in chart  Past Medical History: Reviewed in chart  Tobacco: He reports that he has been smoking vaping device. He has never used smokeless tobacco.Nicotine/Tobacco Cessation Plan  Information offered: Patient not interested at  this time  Alcohol: very rare    Medication Adherence/Access: no issues reported    MS:   - Vitamin D 2000 units daily  He has stopped taking Tylenol since seeing Dr. Haile. Primary concern is cognition/memory. They are interested in proceeding with Ocrevus infusions.     Baseline screenings:   Hep B surface antibody non-reactive   Hep B surface antigen non-reactive  Hep B core antibody non-reactive  Immunoglobulins A, G, M completed  CLAUDIO Virus antibody positive   Varicella antibody positive   CBC with differential completed   Hepatic panel completed previously 8/11/23    Disease Modifying Therapy History:   - N/A    Anxiety:   - Paroxetine 20 mg daily  Wife states the paroxetine was started after his symptoms of MS began so she does not think the paroxetine has made his memory worse. They are wondering about increasing the dosage to help more with anxiety and they plan to discuss this with PCP.     BPH  - Tamsulosin 0.4 mg daily   Patient states this medication is helpful.     Today's Vitals: There were no vitals taken for this visit.  ----------------    I spent 13 minutes with this patient today. All changes were made via collaborative practice agreement with Dr. Haile. A copy of the visit note was provided to the patient's provider(s).    A summary of these recommendations was sent via [employer].    Bhargavi James, Pharm.D.  Medication Therapy Management Pharmacist  [facility]    Telemedicine Visit Details  Type of service:  Telephone visit  Start Time:  12:31 PM  End Time: 12:44 PM     Medication Therapy Recommendations  No medication therapy recommendations to display       Again, thank you for allowing me to participate in the care of your patient.      Sincerely,    Bhargavi James [facility]

## 2024-03-08 NOTE — PROGRESS NOTES
Medication Therapy Management (MTM) Encounter    ASSESSMENT:                            Medication Adherence/Access: No issues identified    MS:  Patient would benefit from starting Ocrevus. Baseline labs reviewed. All patient's questions were answered. Education provided to the patient on medication dosing and potential side effects as well as the infusion process. Recommend completing non-live vaccinations about 2 weeks before first infusion. Patient educated on the need to avoid live-vaccinations and the recommendation to get all indicated non-live vaccines about 4 weeks prior to next infusion once established on therapy. Therapy plan created and [facility] intake forms completed. Of note, Demerol removed from therapy plan, Dr. Haile to add if needed.      Anxiety:   Patient planning to discuss with PCP about possible increase in dose of paroxetine. We discussed that paroxetine can sometimes worsen cognition/memory so they should monitor for this with increased dose of the medication and may consider switching to an alternative selective serotonin reuptake inhibitor if worsening cognition occurs.     BPH:  Stable     PLAN:                            We will be starting Ocrevus infusions. Day 1 you will receive 300 mg, day 15 you will receive another 300 mg and this will serve as your initial dose. Maintenance dosing will be 600 mg every 6 months.   [facility] Intake forms complete, ambulatory care prescription sent and therapy plan ordered. [facility] will be reaching out to you within 1-2 weeks. If you don't hear from them, please let me know.   We discussed vaccinations before and during treatment: Recommended getting non-live vaccines at least 2 weeks prior to first infusion. It is okay to get non-live vaccines while on the infusion, ideally about a month before your next scheduled infusion. If you have any questions or concerns on when to get vaccines, please reach out.   Here is a link  for the financial assistance options offered for Ocrevus for review in case a patient assistance program is necessary: [email or web address]/[email or web address]/[email or web address]-[email or web address]#[email or web address]     Follow-up: 6 months (before first maintenance dose)    SUBJECTIVE/OBJECTIVE:                          Leandro Baires is a 48 year old male called for an initial visit. He was referred to me from Dr. Haile. Patient was accompanied by wife, Jessica.     Reason for visit: Ocrevus coordination.    Allergies/ADRs: Reviewed in chart  Past Medical History: Reviewed in chart  Tobacco: He reports that he has been smoking vaping device. He has never used smokeless tobacco.Nicotine/Tobacco Cessation Plan  Information offered: Patient not interested at this time  Alcohol: very rare    Medication Adherence/Access: no issues reported    MS:   - Vitamin D 2000 units daily  He has stopped taking Tylenol since seeing Dr. Haile. Primary concern is cognition/memory. They are interested in proceeding with Ocrevus infusions.     Baseline screenings:   Hep B surface antibody non-reactive   Hep B surface antigen non-reactive  Hep B core antibody non-reactive  Immunoglobulins A, G, M completed  CLAUDIO Virus antibody positive   Varicella antibody positive   CBC with differential completed   Hepatic panel completed previously 8/11/23    Disease Modifying Therapy History:   - N/A    Anxiety:   - Paroxetine 20 mg daily  Wife states the paroxetine was started after his symptoms of MS began so she does not think the paroxetine has made his memory worse. They are wondering about increasing the dosage to help more with anxiety and they plan to discuss this with PCP.     BPH  - Tamsulosin 0.4 mg daily   Patient states this medication is helpful.     Today's Vitals: There were no vitals taken for this visit.  ----------------    I spent 13 minutes with this patient today. All changes were made via collaborative practice agreement with   Mic. A copy of the visit note was provided to the patient's provider(s).    A summary of these recommendations was sent via [employer].    Arnaldo SomersD.  Medication Therapy Management Pharmacist  [facility]    Telemedicine Visit Details  Type of service:  Telephone visit  Start Time:  12:31 PM  End Time: 12:44 PM     Medication Therapy Recommendations  No medication therapy recommendations to display

## 2024-03-08 NOTE — TELEPHONE ENCOUNTER
Patient to start Ocrevus. Planning to infuse at [facility]. Ambulatory care prescription sent and therapy plan ordered. Intake form sent to [facility].      Bhargavi James, Pharm.D.  Medication Therapy Management Pharmacist  [facility]

## 2024-03-08 NOTE — PROGRESS NOTES
Therapy: Ocrevus  Insurance: [employer] [place]   Ded: $1250  Met: $326    Co-Insurance: 70/30%  Max Out of Pocket: $5000  Met: $386    In reference to referral sent on 03/08/2024 to check coverage for Ocrevus.    Please contact Intake with any questions, [phone] or In Basket pool, [facility] ([phone]).

## 2024-04-05 ENCOUNTER — DOCUMENTATION ONLY (OUTPATIENT)
Dept: PHARMACY | Facility: CLINIC | Age: 49
End: 2024-04-05

## 2024-04-05 ENCOUNTER — HOME INFUSION (PRE-WILLOW HOME INFUSION) (OUTPATIENT)
Dept: PHARMACY | Facility: CLINIC | Age: 49
End: 2024-04-05
Payer: COMMERCIAL

## 2024-04-05 DIAGNOSIS — G35 MS (MULTIPLE SCLEROSIS) (H): Primary | ICD-10-CM

## 2024-04-05 RX ORDER — ALBUTEROL SULFATE 0.83 MG/ML
2.5 SOLUTION RESPIRATORY (INHALATION)
Status: CANCELLED | OUTPATIENT
Start: 2024-04-19

## 2024-04-05 RX ORDER — METHYLPREDNISOLONE SODIUM SUCCINATE 125 MG/2ML
125 INJECTION, POWDER, LYOPHILIZED, FOR SOLUTION INTRAMUSCULAR; INTRAVENOUS
Status: CANCELLED
Start: 2024-04-19

## 2024-04-05 RX ORDER — DIPHENHYDRAMINE HYDROCHLORIDE 50 MG/ML
50 INJECTION INTRAMUSCULAR; INTRAVENOUS
Status: CANCELLED
Start: 2024-04-19

## 2024-04-05 RX ORDER — HEPARIN SODIUM (PORCINE) LOCK FLUSH IV SOLN 100 UNIT/ML 100 UNIT/ML
5 SOLUTION INTRAVENOUS
Status: CANCELLED | OUTPATIENT
Start: 2024-04-19

## 2024-04-05 RX ORDER — DIPHENHYDRAMINE HCL 50 MG
50 CAPSULE ORAL ONCE
Status: CANCELLED | OUTPATIENT
Start: 2024-04-19

## 2024-04-05 RX ORDER — HEPARIN SODIUM,PORCINE 10 UNIT/ML
5-20 VIAL (ML) INTRAVENOUS DAILY PRN
Status: CANCELLED | OUTPATIENT
Start: 2024-04-19

## 2024-04-05 RX ORDER — METHYLPREDNISOLONE SODIUM SUCCINATE 125 MG/2ML
125 INJECTION, POWDER, LYOPHILIZED, FOR SOLUTION INTRAMUSCULAR; INTRAVENOUS ONCE
Status: CANCELLED | OUTPATIENT
Start: 2024-04-19

## 2024-04-05 RX ORDER — EPINEPHRINE 1 MG/ML
0.3 INJECTION, SOLUTION, CONCENTRATE INTRAVENOUS EVERY 5 MIN PRN
Status: CANCELLED | OUTPATIENT
Start: 2024-04-19

## 2024-04-05 RX ORDER — ACETAMINOPHEN 325 MG/1
650 TABLET ORAL ONCE
Status: CANCELLED | OUTPATIENT
Start: 2024-04-19

## 2024-04-05 RX ORDER — ALBUTEROL SULFATE 90 UG/1
1-2 AEROSOL, METERED RESPIRATORY (INHALATION)
Status: CANCELLED
Start: 2024-04-19

## 2024-04-05 NOTE — PROGRESS NOTES
"Skilled Nurse visit in the [facility] to administer Ocrelizumab (OCREVUS) 300 mg. No recent elevated temperature, fever, chills, productive cough, coughing for 3 weeks or longer or hemoptysis, abnormal vital signs, night sweats, chest pain. No decrease in your appetite, unexplained weight loss or fatigue. No other new onset medical symptoms. Current weight 145.6lb. Peripheral IV right AC, 1 attempt. Pre medicated with MethylPREDNISolone 125mg IV, tylenol 650mg, benadryl 50mg 30\" prior. Infusion completed without complication or reaction. Pt reports therapy iseffective in managing symptoms related to therapy.   "

## 2024-04-19 ENCOUNTER — DOCUMENTATION ONLY (OUTPATIENT)
Dept: PHARMACY | Facility: CLINIC | Age: 49
End: 2024-04-19

## 2024-04-19 ENCOUNTER — OFFICE VISIT (OUTPATIENT)
Dept: PHARMACY | Facility: CLINIC | Age: 49
End: 2024-04-19
Payer: COMMERCIAL

## 2024-04-19 DIAGNOSIS — G35 MS (MULTIPLE SCLEROSIS) (H): Primary | ICD-10-CM

## 2024-04-19 RX ORDER — ALBUTEROL SULFATE 90 UG/1
1-2 AEROSOL, METERED RESPIRATORY (INHALATION)
Start: 2024-05-03

## 2024-04-19 RX ORDER — METHYLPREDNISOLONE SODIUM SUCCINATE 125 MG/2ML
125 INJECTION, POWDER, LYOPHILIZED, FOR SOLUTION INTRAMUSCULAR; INTRAVENOUS ONCE
OUTPATIENT
Start: 2024-05-03

## 2024-04-19 RX ORDER — METHYLPREDNISOLONE SODIUM SUCCINATE 125 MG/2ML
125 INJECTION, POWDER, LYOPHILIZED, FOR SOLUTION INTRAMUSCULAR; INTRAVENOUS
Start: 2024-05-03

## 2024-04-19 RX ORDER — EPINEPHRINE 1 MG/ML
0.3 INJECTION, SOLUTION, CONCENTRATE INTRAVENOUS EVERY 5 MIN PRN
OUTPATIENT
Start: 2024-05-03

## 2024-04-19 RX ORDER — HEPARIN SODIUM (PORCINE) LOCK FLUSH IV SOLN 100 UNIT/ML 100 UNIT/ML
5 SOLUTION INTRAVENOUS
OUTPATIENT
Start: 2024-05-03

## 2024-04-19 RX ORDER — DIPHENHYDRAMINE HCL 50 MG
50 CAPSULE ORAL ONCE
OUTPATIENT
Start: 2024-05-03

## 2024-04-19 RX ORDER — ACETAMINOPHEN 325 MG/1
650 TABLET ORAL ONCE
OUTPATIENT
Start: 2024-05-03

## 2024-04-19 RX ORDER — DIPHENHYDRAMINE HYDROCHLORIDE 50 MG/ML
50 INJECTION INTRAMUSCULAR; INTRAVENOUS
Start: 2024-05-03

## 2024-04-19 RX ORDER — HEPARIN SODIUM,PORCINE 10 UNIT/ML
5-20 VIAL (ML) INTRAVENOUS DAILY PRN
OUTPATIENT
Start: 2024-05-03

## 2024-04-19 RX ORDER — ALBUTEROL SULFATE 0.83 MG/ML
2.5 SOLUTION RESPIRATORY (INHALATION)
OUTPATIENT
Start: 2024-05-03

## 2024-04-19 NOTE — PROGRESS NOTES
"Skilled Nurse visit in the [facility] to administer Ocrelizumab (OCREVUS) 300 mg IV.  No recent elevated temperature, fever, chills, productive cough, coughing for 3 weeks or longer or hemoptysis, abnormal vital signs, night sweats, chest pain. No decrease in appetite, unexplained weight loss or fatigue. No other new onset medical symptoms. Current weight 146.2 lb. Peripheral IV right mid FA,  1 attempt. Pre medicated with MethylPREDNISolone 125mg IVP, tylenol 650mg, benadryl 50mg 30\" prior. Infusion completed without complication or reaction. Pt reports that he tolerated his first dose without any delayed reactions or side effects.      MONIQUE HoganN, RN  [phone]  [email or web address]   "

## 2024-04-22 ENCOUNTER — DOCUMENTATION ONLY (OUTPATIENT)
Dept: NEUROLOGY | Facility: CLINIC | Age: 49
End: 2024-04-22
Payer: COMMERCIAL

## 2024-04-22 ENCOUNTER — MEDICAL CORRESPONDENCE (OUTPATIENT)
Dept: HEALTH INFORMATION MANAGEMENT | Facility: CLINIC | Age: 49
End: 2024-04-22
Payer: COMMERCIAL

## 2024-04-22 NOTE — PROGRESS NOTES
Received fax from [facility] for Care Plan/Treatment Orders. Signed by Dr. Haile and faxed back. (Fax # [phone])

## 2024-05-16 ENCOUNTER — MEDICAL CORRESPONDENCE (OUTPATIENT)
Dept: HEALTH INFORMATION MANAGEMENT | Facility: CLINIC | Age: 49
End: 2024-05-16

## 2024-06-10 ENCOUNTER — LAB (OUTPATIENT)
Dept: LAB | Facility: CLINIC | Age: 49
End: 2024-06-10
Payer: COMMERCIAL

## 2024-06-10 ENCOUNTER — OFFICE VISIT (OUTPATIENT)
Dept: NEUROLOGY | Facility: CLINIC | Age: 49
End: 2024-06-10
Payer: COMMERCIAL

## 2024-06-10 VITALS — DIASTOLIC BLOOD PRESSURE: 70 MMHG | HEART RATE: 72 BPM | SYSTOLIC BLOOD PRESSURE: 106 MMHG

## 2024-06-10 DIAGNOSIS — Z51.81 THERAPEUTIC DRUG MONITORING: ICD-10-CM

## 2024-06-10 DIAGNOSIS — F03.90 MAJOR NEUROCOGNITIVE DISORDER (H): ICD-10-CM

## 2024-06-10 DIAGNOSIS — G35 MS (MULTIPLE SCLEROSIS) (H): ICD-10-CM

## 2024-06-10 DIAGNOSIS — G35 MS (MULTIPLE SCLEROSIS) (H): Primary | ICD-10-CM

## 2024-06-10 DIAGNOSIS — G43.719 INTRACTABLE CHRONIC MIGRAINE WITHOUT AURA AND WITHOUT STATUS MIGRAINOSUS: ICD-10-CM

## 2024-06-10 LAB
BASOPHILS # BLD AUTO: 0 10E3/UL (ref 0–0.2)
BASOPHILS NFR BLD AUTO: 1 %
CD19 B CELL COMMENT: ABNORMAL
CD19 CELLS # BLD: <1 CELLS/UL (ref 107–698)
CD19 CELLS NFR BLD: <1 % (ref 6–27)
EOSINOPHIL # BLD AUTO: 0.2 10E3/UL (ref 0–0.7)
EOSINOPHIL NFR BLD AUTO: 2 %
ERYTHROCYTE [DISTWIDTH] IN BLOOD BY AUTOMATED COUNT: 13.9 % (ref 10–15)
HCT VFR BLD AUTO: 43.5 % (ref 40–53)
HGB BLD-MCNC: 14 G/DL (ref 13.3–17.7)
IMM GRANULOCYTES # BLD: 0 10E3/UL
IMM GRANULOCYTES NFR BLD: 0 %
LYMPHOCYTES # BLD AUTO: 1.4 10E3/UL (ref 0.8–5.3)
LYMPHOCYTES NFR BLD AUTO: 19 %
MCH RBC QN AUTO: 27.5 PG (ref 26.5–33)
MCHC RBC AUTO-ENTMCNC: 32.2 G/DL (ref 31.5–36.5)
MCV RBC AUTO: 86 FL (ref 78–100)
MONOCYTES # BLD AUTO: 0.4 10E3/UL (ref 0–1.3)
MONOCYTES NFR BLD AUTO: 6 %
NEUTROPHILS # BLD AUTO: 5.4 10E3/UL (ref 1.6–8.3)
NEUTROPHILS NFR BLD AUTO: 72 %
NRBC # BLD AUTO: 0 10E3/UL
NRBC BLD AUTO-RTO: 0 /100
PLATELET # BLD AUTO: 268 10E3/UL (ref 150–450)
RBC # BLD AUTO: 5.09 10E6/UL (ref 4.4–5.9)
WBC # BLD AUTO: 7.4 10E3/UL (ref 4–11)

## 2024-06-10 PROCEDURE — 99215 OFFICE O/P EST HI 40 MIN: CPT | Performed by: PSYCHIATRY & NEUROLOGY

## 2024-06-10 PROCEDURE — 82784 ASSAY IGA/IGD/IGG/IGM EACH: CPT

## 2024-06-10 PROCEDURE — 86355 B CELLS TOTAL COUNT: CPT

## 2024-06-10 PROCEDURE — 85004 AUTOMATED DIFF WBC COUNT: CPT

## 2024-06-10 PROCEDURE — 36415 COLL VENOUS BLD VENIPUNCTURE: CPT

## 2024-06-10 PROCEDURE — G2211 COMPLEX E/M VISIT ADD ON: HCPCS | Performed by: PSYCHIATRY & NEUROLOGY

## 2024-06-10 RX ORDER — PROPRANOLOL HCL 60 MG
60 CAPSULE, EXTENDED RELEASE 24HR ORAL DAILY
Qty: 30 CAPSULE | Refills: 11 | Status: SHIPPED | OUTPATIENT
Start: 2024-06-10

## 2024-06-10 NOTE — PATIENT INSTRUCTIONS
Continue ocrevus - due in October    Blood work today     Mri in 6 months     OT evaluation for memory     Try propranolol to prevent headaches - take everyday   Let me know if this causes light headedness     Follow up in 6 months

## 2024-06-10 NOTE — PROGRESS NOTES
Date of Service: 6/10/2024    [facility] Neurology   MS Clinic Evaluation    Subjective: 48-year-old otherwise healthy man who presents for evaluation of multiple sclerosis.    He is accompanied by his wife, Jessica, who assists in providing history.    They have noted a physical decline since his last visit. Balance is becoming more challenging. He has had a few falls. One when getting up from the couch. He has difficulty going upstairs due to imbalance.      He is sleeping more.      He does go for walks routinely. Typically 2 blocks at a time.  He will do this a couple times per day. He had an episode of bowel incontinence during one of his walks. When he returned home he did not know how to manage the bowel incontinence - he simply put his soiled clothes in the hamper.      He has had difficulty doing regular tasks such as putting away clothing - though his wife notes that she did rearrange the drawers to make them easier for him to access.     He is not doing hygenic cares such as showers as routinely as he was previoiusly.     He still vapes nicotine.  He will seek money from family members to pay for this.      He has difficulty putting together a sentence.     Headaches are occurring on a daily basis. They are typically 7/10 in severity and characterized by a throbbing sensation in the temporal regions.  It is bothersome enough that he is interested in trying a medication to reduce headaches.     He tolerated the ocrevus infusions well. Energy was better immediately after infusions.     No infections since receiving ocrevus.     His parents are in denial of his situation.  This has made it particularly challenging for his wife to get proper support from them. She has not yet connected with the [facility] for assistance with care.     Disease onset: age 45, progressive decline in cognition  Last relapse: n/a  Clinical course: primary progressive    DMD hx:   Ocrevus 4/5/2024-present, LD 4/19/24    No Known  Allergies    Current Outpatient Medications   Medication Sig Dispense Refill    ocrelizumab (OCREVUS) 300 MG/10ML SOLN injection Inject 20 mLs (600 mg) into the vein every 6 months      PARoxetine (PAXIL) 20 MG tablet Take 1 tablet by mouth every morning      tamsulosin (FLOMAX) 0.4 MG capsule Take 1 capsule by mouth daily      vitamin D3 (CHOLECALCIFEROL) 50 mcg (2000 units) tablet Take 1 tablet by mouth daily       No current facility-administered medications for this visit.        Past medical, surgical, social and family history was personally reviewed. Pertinent details noted above.     Physical Examination:   /70 (BP Location: Right arm, Patient Position: Sitting)   Pulse 72     General: no acute distress  Awake, alert, responds to simple questions appropriately, some perseveration, difficulty following motor commands on the left side   Cranial nerves:   VFFC  PERRL w/no RAPD  EOM full w/no MIKI but smooth pursuit is saccadic   ?sl left facial paresis   Hearing intact  No dysarthria   Motor:   Tone is increased on the left side  Bulk is normal     R L  Deltoid  5 5  Biceps  5 5  Triceps 5 5  Wrist ext 5 5  Finger ext 5 5  Finger abd 5 5    Hip flexion 5 5  Knee flexion 5 5  Knee ext 5 5  Ankle d/f 5 5    Reflexes: 3+ left side, babinski absent bilaterally  Sensory: vibration is severely reduced in the toes, mildly reduced in the ankles, JPS is difficult to assess  Romberg is present  Coordination: no ataxia or dysmetria  Gait: sl sensory ataxic gait w slight steppage gait w left foot, tandem gait is moderately impaired, able to balance on one foot and hop x 5 bilaterally    Tests/Imaging:   CSF 13 ocb    Vitamin D 20  B12 553        MRI Brain  9/2022 - diffuse white matter t2 hyperintensity, brain atrophy noted    MRI Cervical spine   8/2022 - images not available for review but reported neg for ms lesions    MRI Thoracic spine   8/2022 - images not available but neg for ms lesions    Assessment:  48-year-old otherwise healthy man who has experienced chronic progressive cognitive changes including impairments in executive function.  MRI is remarkable for diffuse white matter changes and CSF is positive for oligoclonal bands.  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    He is now s/p ocrevus. He has tolerated treatment and is advised to continue with standard dosing.     We discussed expected progression of his disease. Multiple sclerosis can shorten the lifespan on average by 5 years.  However, his multiple sclerosis has progressed rather rapidly, so it is reasonable to suspect that his lifespan will be more signifcantly affected. Gross estimate given today of 10 year. We discussed how most patients with MS pass away from infections rather than from MS itself.     OT evaluation is advised to provide guidance on level of supervision Leandro needs for safety. This information might be helpful for Leandro's parents.     I advised a trial of propranolol for chronic migraine.     Plan:   - continue ocrevus 600 mg every 6 months   - OT eval   - blood work today to assess for appropriate reponse to ocrevus   - mri in 6 months   - propranolol trial   - follow up after MRI     Note was completed with the assistance of Dragon Fluency software which can often result in accidental word substitutions.   The longitudinal plan of care for the diagnosis(es)/condition(s) as documented were addressed during this visit. Due to the added complexity in care, I will continue to support Leandro in the subsequent management and with ongoing continuity of care.    A total of 45 minutes on the date of service were spent in the care of this patient.   Brooklynn Haile MD on 6/10/2024 at 9:01 AM

## 2024-06-10 NOTE — LETTER
6/10/2024      Leandro Baires  [address] [place] [facility] [place] [address]      Dear Colleague,    Thank you for referring your patient, Leandro Baires, to the [facility]UR[facility]. Please see a copy of my visit note below.    Date of Service: 6/10/2024    [facility]   MS Clinic Evaluation    Subjective: 48-year-old otherwise healthy man who presents for evaluation of multiple sclerosis.    He is accompanied by his wife, Jessica, who assists in providing history.    They have noted a physical decline since his last visit. Balance is becoming more challenging. He has had a few falls. One when getting up from the couch. He has difficulty going upstairs due to imbalance.      He is sleeping more.      He does go for walks routinely. Typically 2 blocks at a time.  He will do this a couple times per day. He had an episode of bowel incontinence during one of his walks. When he returned home he did not know how to manage the bowel incontinence - he simply put his soiled clothes in the hamper.      He has had difficulty doing regular tasks such as putting away clothing - though his wife notes that she did rearrange the drawers to make them easier for him to access.     He is not doing hygenic cares such as showers as routinely as he was previoiusly.     He still vapes nicotine.  He will seek money from family members to pay for this.      He has difficulty putting together a sentence.     Headaches are occurring on a daily basis. They are typically 7/10 in severity and characterized by a throbbing sensation in the temporal regions.  It is bothersome enough that he is interested in trying a medication to reduce headaches.     He tolerated the ocrevus infusions well. Energy was better immediately after infusions.     No infections since receiving ocrevus.     His parents are in denial of his situation.  This has made it particularly challenging for his wife to get proper support from  them. She has not yet connected with the [facility] for assistance with care.     Disease onset: age 45, progressive decline in cognition  Last relapse: n/a  Clinical course: primary progressive    DMD hx:   Ocrevus 4/5/2024-present, LD 4/19/24    No Known Allergies    Current Outpatient Medications   Medication Sig Dispense Refill     ocrelizumab (OCREVUS) 300 MG/10ML SOLN injection Inject 20 mLs (600 mg) into the vein every 6 months       PARoxetine (PAXIL) 20 MG tablet Take 1 tablet by mouth every morning       tamsulosin (FLOMAX) 0.4 MG capsule Take 1 capsule by mouth daily       vitamin D3 (CHOLECALCIFEROL) 50 mcg (2000 units) tablet Take 1 tablet by mouth daily       No current facility-administered medications for this visit.        Past medical, surgical, social and family history was personally reviewed. Pertinent details noted above.     Physical Examination:   /70 (BP Location: Right arm, Patient Position: Sitting)   Pulse 72     General: no acute distress  Awake, alert, responds to simple questions appropriately, some perseveration, difficulty following motor commands on the left side   Cranial nerves:   VFFC  PERRL w/no RAPD  EOM full w/no MIKI but smooth pursuit is saccadic   ?sl left facial paresis   Hearing intact  No dysarthria   Motor:   Tone is increased on the left side  Bulk is normal     R L  Deltoid  5 5  Biceps  5 5  Triceps 5 5  Wrist ext 5 5  Finger ext 5 5  Finger abd 5 5    Hip flexion 5 5  Knee flexion 5 5  Knee ext 5 5  Ankle d/f 5 5    Reflexes: 3+ left side, babinski absent bilaterally  Sensory: vibration is severely reduced in the toes, mildly reduced in the ankles, JPS is difficult to assess  Romberg is present  Coordination: no ataxia or dysmetria  Gait: sl sensory ataxic gait w slight steppage gait w left foot, tandem gait is moderately impaired, able to balance on one foot and hop x 5 bilaterally    Tests/Imaging:   CSF 13 ocb    Vitamin D 20  B12 553        MRI Brain  9/2022 -  diffuse white matter t2 hyperintensity, brain atrophy noted    MRI Cervical spine   8/2022 - images not available for review but reported neg for ms lesions    MRI Thoracic spine   8/2022 - images not available but neg for ms lesions    Assessment: 48-year-old otherwise healthy man who has experienced chronic progressive cognitive changes including impairments in executive function.  MRI is remarkable for diffuse white matter changes and CSF is positive for oligoclonal bands.  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    He is now s/p ocrevus. He has tolerated treatment and is advised to continue with standard dosing.     We discussed expected progression of his disease. Multiple sclerosis can shorten the lifespan on average by 5 years.  However, his multiple sclerosis has progressed rather rapidly, so it is reasonable to suspect that his lifespan will be more signifcantly affected. Gross estimate given today of 10 year. We discussed how most patients with MS pass away from infections rather than from MS itself.     OT evaluation is advised to provide guidance on level of supervision Leandro needs for safety. This information might be helpful for Leandro's parents.     I advised a trial of propranolol for chronic migraine.     Plan:   - continue ocrevus 600 mg every 6 months   - OT eval   - blood work today to assess for appropriate reponse to ocrevus   - mri in 6 months   - propranolol trial   - follow up after MRI     Note was completed with the assistance of Dragon Fluency software which can often result in accidental word substitutions.   The longitudinal plan of care for the diagnosis(es)/condition(s) as documented were addressed during this visit. Due to the added complexity in care, I will continue to support Leandro in the subsequent management and with ongoing continuity of care.    A total of 45 minutes on the date of service  were spent in the care of this patient.   Brooklynn Haile MD on 6/10/2024 at 9:01 AM          Again, thank you for allowing me to participate in the care of your patient.        Sincerely,        Brooklynn Haile MD

## 2024-06-10 NOTE — NURSING NOTE
Chief Complaint   Patient presents with    Follow Up       TERRANCE Kraus on 6/10/2024 at 8:54 AM

## 2024-06-11 LAB — IGG SERPL-MCNC: 869 MG/DL (ref 610–1616)

## 2024-10-20 ENCOUNTER — HOME INFUSION (OUTPATIENT)
Dept: HOME HEALTH SERVICES | Facility: HOME HEALTH | Age: 49
End: 2024-10-20
Payer: COMMERCIAL

## 2024-10-20 ENCOUNTER — HEALTH MAINTENANCE LETTER (OUTPATIENT)
Age: 49
End: 2024-10-20

## 2024-10-20 VITALS — WEIGHT: 146.2 LBS

## 2024-11-20 ENCOUNTER — TELEPHONE (OUTPATIENT)
Dept: PHARMACY | Facility: CLINIC | Age: 49
End: 2024-11-20
Payer: COMMERCIAL

## 2024-11-20 ENCOUNTER — VIRTUAL VISIT (OUTPATIENT)
Dept: PHARMACY | Facility: CLINIC | Age: 49
End: 2024-11-20
Attending: PSYCHIATRY & NEUROLOGY
Payer: COMMERCIAL

## 2024-11-20 DIAGNOSIS — G35 MS (MULTIPLE SCLEROSIS) (H): Primary | ICD-10-CM

## 2024-11-20 RX ORDER — ALBUTEROL SULFATE 90 UG/1
1-2 INHALANT RESPIRATORY (INHALATION)
Start: 2024-11-20

## 2024-11-20 RX ORDER — DIPHENHYDRAMINE HCL 50 MG
50 CAPSULE ORAL ONCE
OUTPATIENT
Start: 2024-11-20

## 2024-11-20 RX ORDER — HEPARIN SODIUM,PORCINE 10 UNIT/ML
5-20 VIAL (ML) INTRAVENOUS DAILY PRN
OUTPATIENT
Start: 2024-11-20

## 2024-11-20 RX ORDER — EPINEPHRINE 1 MG/ML
0.3 INJECTION, SOLUTION, CONCENTRATE INTRAVENOUS EVERY 5 MIN PRN
OUTPATIENT
Start: 2024-11-20

## 2024-11-20 RX ORDER — METHYLPREDNISOLONE SODIUM SUCCINATE 40 MG/ML
40 INJECTION INTRAMUSCULAR; INTRAVENOUS
Start: 2024-11-20

## 2024-11-20 RX ORDER — METHYLPREDNISOLONE SODIUM SUCCINATE 125 MG/2ML
125 INJECTION INTRAMUSCULAR; INTRAVENOUS ONCE
OUTPATIENT
Start: 2024-11-20

## 2024-11-20 RX ORDER — DIPHENHYDRAMINE HYDROCHLORIDE 50 MG/ML
25 INJECTION INTRAMUSCULAR; INTRAVENOUS
Start: 2024-11-20

## 2024-11-20 RX ORDER — ALBUTEROL SULFATE 0.83 MG/ML
2.5 SOLUTION RESPIRATORY (INHALATION)
OUTPATIENT
Start: 2024-11-20

## 2024-11-20 RX ORDER — DIPHENHYDRAMINE HYDROCHLORIDE 50 MG/ML
50 INJECTION INTRAMUSCULAR; INTRAVENOUS
Start: 2024-11-20

## 2024-11-20 RX ORDER — HEPARIN SODIUM (PORCINE) LOCK FLUSH IV SOLN 100 UNIT/ML 100 UNIT/ML
5 SOLUTION INTRAVENOUS
OUTPATIENT
Start: 2024-11-20

## 2024-11-20 RX ORDER — ACETAMINOPHEN 325 MG/1
650 TABLET ORAL ONCE
OUTPATIENT
Start: 2024-11-20

## 2024-11-20 NOTE — CONFIDENTIAL NOTE
PharmD Outbound Call:     Reason for call:  schedule MTM visit     Patient and his wife Jessica are available for a visit now to discuss Ocrevus infusions. Please see MTM visit dated 11/120/24.    Susan Benz Pharm.D., MPH  Medication Therapy Management Pharmacist   [facility]

## 2024-11-20 NOTE — PATIENT INSTRUCTIONS
"Recommendations from today's MTM visit:                                                      Maintenance dosing (600mg every 6 months) orders have been placed. Please ensure to get your next infusion scheduled. If you run into any issues, reach out    Follow-up:   Appointments in Next Year      Nov 20, 2024 1:30 PM  Pharmacist Visit with Susan Benz [facility] MTM ([facility] ) [phone]     Jul 21, 2025 8:30 AM  Pharmacist Visit with Susan Benz [facility] MTM ([facility] ) [phone]            It was great speaking with you today.  I value your experience and would be very thankful for your time in providing feedback in our clinic survey. In the next few days, you may receive an email or text message from [employer] with a link to a survey related to your  clinical pharmacist.\"     To schedule another MTM appointment, please call the clinic directly or you may call the MTM scheduling line at [phone].    My Clinical Pharmacist's contact information:                                                      Please feel free to contact me with any questions or concerns you have.      Susan Benz, Pharm.D., MPH  Medication Therapy Management Pharmacist   [facility]   "

## 2024-11-20 NOTE — PROGRESS NOTES
Medication Therapy Management (MTM) Encounter    ASSESSMENT:                            Medication Adherence/Access: No issues identified.    MS:  Tolerated infusions well. Patient to continue on standard dosing of Ocrevus per neurologist recommendations. All questions answered about the infusion process and MTM role.     PLAN:                            Maintenance dosing (600mg every 6 months) orders have been placed. Please ensure to get your next infusion scheduled. If you run into any issues, reach out    Follow-up:   Appointments in Next Year      Nov 20, 2024 1:30 PM  Pharmacist Visit with Susan Benz [facility] ([facility] ) [phone]     Jul 21, 2025 8:30 AM  Pharmacist Visit with Susan Benz [facility] ([facility] ) [phone]            SUBJECTIVE/OBJECTIVE:                          Leandro Baires is a 48 year old male seen for a follow-up visit.  He is accompanied by his wife Jessica.     Reason for visit: follow up.    Allergies/ADRs: Reviewed in chart  Past Medical History: Reviewed in chart  Tobacco: He reports that he has been smoking vaping device. He has never used smokeless tobacco.Nicotine/Tobacco Cessation Plan  Not discussed  Alcohol: not discussed today    Medication Adherence/Access: no issues reported.    MS:   - Vitamin D 2000 units daily  - Ocrevus infusions. He completed two 1/2 doses on 4/5/24 and 4/19/24 at [facility] in suites.     He is really happy with how well he tolerated the infusions. No issues during or after the infusions. He is currently late for his next infusion as he was unaware that he needed to meet with the MTM pharmacist to get his maintenance dose ordered.     Today's Vitals: There were no vitals taken for this visit.  ----------------    I spent 9 minutes with this patient today. All changes were made via collaborative practice  agreement with Brooklynn Haile. A copy of the visit note was provided to the patient's provider(s).    A summary of these recommendations was sent via [email or web address].    Susan Benz, Pharm.D., MPH  Medication Therapy Management Pharmacist   [facility]    Telemedicine Visit Details  The patient's medications can be safely assessed via a telemedicine encounter.  Type of service:  Telephone visit  Originating Location (pt. Location): Home    Distant Location (provider location):  Off-site  Start Time:  8:15 AM  End Time: 8:24 AM     Medication Therapy Recommendations  No medication therapy recommendations to display

## 2024-11-29 ENCOUNTER — HOME INFUSION (OUTPATIENT)
Dept: HOME HEALTH SERVICES | Facility: HOME HEALTH | Age: 49
End: 2024-11-29
Payer: COMMERCIAL

## 2024-12-03 ENCOUNTER — HOME CARE VISIT (OUTPATIENT)
Dept: HOME HEALTH SERVICES | Facility: HOME HEALTH | Age: 49
End: 2024-12-03

## 2024-12-03 ENCOUNTER — HOME INFUSION BILLING (OUTPATIENT)
Dept: HOME HEALTH SERVICES | Facility: HOME HEALTH | Age: 49
End: 2024-12-03
Payer: COMMERCIAL

## 2024-12-03 VITALS
TEMPERATURE: 97.3 F | OXYGEN SATURATION: 99 % | HEART RATE: 81 BPM | DIASTOLIC BLOOD PRESSURE: 67 MMHG | SYSTOLIC BLOOD PRESSURE: 112 MMHG | RESPIRATION RATE: 18 BRPM

## 2024-12-03 PROCEDURE — S1015 IV TUBING EXTENSION SET: HCPCS

## 2024-12-03 PROCEDURE — A4215 STERILE NEEDLE: HCPCS

## 2024-12-03 PROCEDURE — S9338 HIT IMMUNOTHERAPY DIEM: HCPCS

## 2024-12-03 PROCEDURE — E0781 EXTERNAL AMBULATORY INFUS PU: HCPCS | Mod: RR

## 2024-12-03 NOTE — PROGRESS NOTES
"Infusion Nursing Note:  Skilled nurse visit today for ocrevus infusion  for Leandro Baires.   present during visit today: Not Applicable.    Pre-infusion Checklist:   Pre-infusion Checklist    Have you had any delayed reaction since last infusion?   No    Have you recently had an elevated temperature, fever, chills productive cough, coughing for 3 weeks or longer or hemoptysis, abnormal vital signs, night sweats, chest pain, or have you noticed a decrease in your appetite, or noted unexplained weight loss or fatigue?   No    Do you have any open wounds or new incisions?  No    Do you have any recent or upcoming hospitalizations, surgeries, or dental procedures?   No    Do you currently have or recently have had any signs of illness or infection or are you on any antibiotics?   No    Have you had any new, sudden , or worsening abdominal pain?   No    Have you or anyone in your household received a live vaccination in the past 4 weeks?   No    Have you recently been diagnosed with any new nervous system diseases or cancer diagnosis? (i.e., Multiple Sclerosis, Guillain [place], sezures, neurological changes) Are you receiving any form of radiation or chemotherapy?   No    Are you pregnant or breastfeeding, or do you have plans of pregnancy in the future?   No    Have you been having any signs of worsening depression or suicidal ideation?  No    Have there been any other new onset medical symptoms?  No    Did the patient answer \"YES\" to any of the questions above?  No    Will the patient receive a medication that has an order for infusion reaction management?  Yes, and all drugs and supplies are available and none have .    If ordered, has the patient taken pre-medications?  Yes    Plan:   Therapy is appropriate, will proceed with treatment.     Chemotherapy Treatment Conditions:   Not Applicable    Intravenous Access:  Peripheral IV placed.    Post Infusion Assessment:  Patient tolerated infusion " without incident.  Patient observed for 60 minutes post infusion per protocol.  Access discontinued per protocol.       Note: Pts dad here with pt during infusion. Pt has a difficult time with word finding. Somewhat anxious at times. Alert, yet seems a bit disoriented to situation. Has hx of dementia. Per pt and father, MS has been stable. Pt denies any N/T. Denies any falls. Slow gait noted. Infusion tolerated well.     PREMEDS:   650 mg tylenol and 50 mg benadryl given at 1020  125 mg methylpredinoslone given at 1257-7093    Saline administered (in mLs): 30    Next Visit Plan:   6 months:       Daya Villarreal, RN 12/3/2024

## 2024-12-04 PROCEDURE — E0781 EXTERNAL AMBULATORY INFUS PU: HCPCS | Mod: RR

## 2024-12-05 PROCEDURE — E0781 EXTERNAL AMBULATORY INFUS PU: HCPCS | Mod: RR

## 2024-12-06 PROCEDURE — E0781 EXTERNAL AMBULATORY INFUS PU: HCPCS | Mod: RR

## 2024-12-07 PROCEDURE — E0781 EXTERNAL AMBULATORY INFUS PU: HCPCS | Mod: RR

## 2024-12-08 PROCEDURE — E0781 EXTERNAL AMBULATORY INFUS PU: HCPCS | Mod: RR

## 2024-12-09 PROCEDURE — E0781 EXTERNAL AMBULATORY INFUS PU: HCPCS | Mod: RR

## 2024-12-10 PROCEDURE — E0781 EXTERNAL AMBULATORY INFUS PU: HCPCS | Mod: RR

## 2024-12-11 PROCEDURE — E0781 EXTERNAL AMBULATORY INFUS PU: HCPCS | Mod: RR

## 2024-12-12 PROCEDURE — E0781 EXTERNAL AMBULATORY INFUS PU: HCPCS | Mod: RR

## 2024-12-13 PROCEDURE — E0781 EXTERNAL AMBULATORY INFUS PU: HCPCS | Mod: RR

## 2024-12-14 PROCEDURE — E0781 EXTERNAL AMBULATORY INFUS PU: HCPCS | Mod: RR

## 2024-12-15 PROCEDURE — E0781 EXTERNAL AMBULATORY INFUS PU: HCPCS | Mod: RR

## 2024-12-16 PROCEDURE — E0781 EXTERNAL AMBULATORY INFUS PU: HCPCS | Mod: RR

## 2024-12-17 PROCEDURE — E0781 EXTERNAL AMBULATORY INFUS PU: HCPCS | Mod: RR

## 2024-12-18 PROCEDURE — E0781 EXTERNAL AMBULATORY INFUS PU: HCPCS | Mod: RR

## 2024-12-19 PROCEDURE — E0781 EXTERNAL AMBULATORY INFUS PU: HCPCS | Mod: RR

## 2024-12-20 PROCEDURE — E0781 EXTERNAL AMBULATORY INFUS PU: HCPCS | Mod: RR

## 2024-12-21 PROCEDURE — E0781 EXTERNAL AMBULATORY INFUS PU: HCPCS | Mod: RR

## 2024-12-22 PROCEDURE — E0781 EXTERNAL AMBULATORY INFUS PU: HCPCS | Mod: RR

## 2024-12-23 PROCEDURE — E0781 EXTERNAL AMBULATORY INFUS PU: HCPCS | Mod: RR

## 2024-12-24 PROCEDURE — E0781 EXTERNAL AMBULATORY INFUS PU: HCPCS | Mod: RR

## 2024-12-25 PROCEDURE — E0781 EXTERNAL AMBULATORY INFUS PU: HCPCS | Mod: RR

## 2024-12-26 PROCEDURE — E0781 EXTERNAL AMBULATORY INFUS PU: HCPCS | Mod: RR

## 2024-12-27 PROCEDURE — E0781 EXTERNAL AMBULATORY INFUS PU: HCPCS | Mod: RR

## 2024-12-28 PROCEDURE — E0781 EXTERNAL AMBULATORY INFUS PU: HCPCS | Mod: RR

## 2024-12-29 PROCEDURE — E0781 EXTERNAL AMBULATORY INFUS PU: HCPCS | Mod: RR

## 2024-12-30 PROCEDURE — E0781 EXTERNAL AMBULATORY INFUS PU: HCPCS | Mod: RR

## 2024-12-31 PROCEDURE — E0781 EXTERNAL AMBULATORY INFUS PU: HCPCS | Mod: RR

## 2025-01-01 PROCEDURE — E0781 EXTERNAL AMBULATORY INFUS PU: HCPCS | Mod: RR

## 2025-01-02 PROCEDURE — E0781 EXTERNAL AMBULATORY INFUS PU: HCPCS | Mod: RR

## 2025-01-03 PROCEDURE — E0781 EXTERNAL AMBULATORY INFUS PU: HCPCS | Mod: RR

## 2025-01-04 PROCEDURE — E0781 EXTERNAL AMBULATORY INFUS PU: HCPCS | Mod: RR

## 2025-01-05 PROCEDURE — E0781 EXTERNAL AMBULATORY INFUS PU: HCPCS | Mod: RR

## 2025-01-06 PROCEDURE — E0781 EXTERNAL AMBULATORY INFUS PU: HCPCS | Mod: RR

## 2025-01-07 PROCEDURE — E0781 EXTERNAL AMBULATORY INFUS PU: HCPCS | Mod: RR

## 2025-01-08 PROCEDURE — E0781 EXTERNAL AMBULATORY INFUS PU: HCPCS | Mod: RR

## 2025-01-09 PROCEDURE — E0781 EXTERNAL AMBULATORY INFUS PU: HCPCS | Mod: RR

## 2025-01-10 PROCEDURE — E0781 EXTERNAL AMBULATORY INFUS PU: HCPCS | Mod: RR

## 2025-01-11 PROCEDURE — E0781 EXTERNAL AMBULATORY INFUS PU: HCPCS | Mod: RR

## 2025-01-12 PROCEDURE — E0781 EXTERNAL AMBULATORY INFUS PU: HCPCS | Mod: RR

## 2025-01-13 PROCEDURE — E0781 EXTERNAL AMBULATORY INFUS PU: HCPCS | Mod: RR

## 2025-01-14 ENCOUNTER — MYC REFILL (OUTPATIENT)
Dept: NEUROLOGY | Facility: CLINIC | Age: 50
End: 2025-01-14
Payer: COMMERCIAL

## 2025-01-14 DIAGNOSIS — G43.719 INTRACTABLE CHRONIC MIGRAINE WITHOUT AURA AND WITHOUT STATUS MIGRAINOSUS: ICD-10-CM

## 2025-01-14 PROCEDURE — E0781 EXTERNAL AMBULATORY INFUS PU: HCPCS | Mod: RR

## 2025-01-14 RX ORDER — PROPRANOLOL HYDROCHLORIDE 60 MG/1
60 CAPSULE, EXTENDED RELEASE ORAL DAILY
Qty: 30 CAPSULE | Refills: 1 | Status: SHIPPED | OUTPATIENT
Start: 2025-01-14

## 2025-01-14 NOTE — TELEPHONE ENCOUNTER
Refill request for the following medication (s) listed below.    Pending Prescriptions:                       Disp   Refills    propranolol ER (INDERAL LA) 60 MG 24 hr c*30 cap*1            Sig: Take 1 capsule (60 mg) by mouth daily.      Last office visit provider:  6/10/2024   Next appointment scheduled: None      Medication T'd for review and signature

## 2025-01-15 PROCEDURE — E0781 EXTERNAL AMBULATORY INFUS PU: HCPCS | Mod: RR

## 2025-01-16 PROCEDURE — E0781 EXTERNAL AMBULATORY INFUS PU: HCPCS | Mod: RR

## 2025-01-17 PROCEDURE — E0781 EXTERNAL AMBULATORY INFUS PU: HCPCS | Mod: RR

## 2025-01-18 PROCEDURE — E0781 EXTERNAL AMBULATORY INFUS PU: HCPCS | Mod: RR

## 2025-01-19 PROCEDURE — E0781 EXTERNAL AMBULATORY INFUS PU: HCPCS | Mod: RR

## 2025-01-20 PROCEDURE — E0781 EXTERNAL AMBULATORY INFUS PU: HCPCS | Mod: RR

## 2025-01-21 PROCEDURE — E0781 EXTERNAL AMBULATORY INFUS PU: HCPCS | Mod: RR

## 2025-01-22 PROCEDURE — E0781 EXTERNAL AMBULATORY INFUS PU: HCPCS | Mod: RR

## 2025-01-23 PROCEDURE — E0781 EXTERNAL AMBULATORY INFUS PU: HCPCS | Mod: RR

## 2025-01-24 PROCEDURE — E0781 EXTERNAL AMBULATORY INFUS PU: HCPCS | Mod: RR

## 2025-01-25 PROCEDURE — E0781 EXTERNAL AMBULATORY INFUS PU: HCPCS | Mod: RR

## 2025-01-26 PROCEDURE — E0781 EXTERNAL AMBULATORY INFUS PU: HCPCS | Mod: RR

## 2025-01-27 PROCEDURE — E0781 EXTERNAL AMBULATORY INFUS PU: HCPCS | Mod: RR

## 2025-01-28 PROCEDURE — E0781 EXTERNAL AMBULATORY INFUS PU: HCPCS | Mod: RR

## 2025-01-29 PROCEDURE — E0781 EXTERNAL AMBULATORY INFUS PU: HCPCS | Mod: RR

## 2025-01-30 PROCEDURE — E0781 EXTERNAL AMBULATORY INFUS PU: HCPCS | Mod: RR

## 2025-01-31 PROCEDURE — E0781 EXTERNAL AMBULATORY INFUS PU: HCPCS | Mod: RR

## 2025-02-01 PROCEDURE — E0781 EXTERNAL AMBULATORY INFUS PU: HCPCS | Mod: RR

## 2025-02-02 PROCEDURE — E0781 EXTERNAL AMBULATORY INFUS PU: HCPCS | Mod: RR

## 2025-02-03 PROCEDURE — E0781 EXTERNAL AMBULATORY INFUS PU: HCPCS | Mod: RR

## 2025-02-04 PROCEDURE — E0781 EXTERNAL AMBULATORY INFUS PU: HCPCS | Mod: RR

## 2025-02-05 PROCEDURE — E0781 EXTERNAL AMBULATORY INFUS PU: HCPCS | Mod: RR

## 2025-02-06 PROCEDURE — E0781 EXTERNAL AMBULATORY INFUS PU: HCPCS | Mod: RR

## 2025-02-07 PROCEDURE — E0781 EXTERNAL AMBULATORY INFUS PU: HCPCS | Mod: RR

## 2025-02-08 PROCEDURE — E0781 EXTERNAL AMBULATORY INFUS PU: HCPCS | Mod: RR

## 2025-02-09 PROCEDURE — E0781 EXTERNAL AMBULATORY INFUS PU: HCPCS | Mod: RR

## 2025-02-10 PROCEDURE — E0781 EXTERNAL AMBULATORY INFUS PU: HCPCS | Mod: RR

## 2025-02-11 PROCEDURE — E0781 EXTERNAL AMBULATORY INFUS PU: HCPCS | Mod: RR

## 2025-02-12 PROCEDURE — E0781 EXTERNAL AMBULATORY INFUS PU: HCPCS | Mod: RR

## 2025-02-13 PROCEDURE — E0781 EXTERNAL AMBULATORY INFUS PU: HCPCS | Mod: RR

## 2025-02-14 PROCEDURE — E0781 EXTERNAL AMBULATORY INFUS PU: HCPCS | Mod: RR

## 2025-02-15 PROCEDURE — E0781 EXTERNAL AMBULATORY INFUS PU: HCPCS | Mod: RR

## 2025-02-16 PROCEDURE — E0781 EXTERNAL AMBULATORY INFUS PU: HCPCS | Mod: RR

## 2025-02-17 PROCEDURE — E0781 EXTERNAL AMBULATORY INFUS PU: HCPCS | Mod: RR

## 2025-02-18 PROCEDURE — E0781 EXTERNAL AMBULATORY INFUS PU: HCPCS | Mod: RR

## 2025-02-19 PROCEDURE — E0781 EXTERNAL AMBULATORY INFUS PU: HCPCS | Mod: RR

## 2025-02-20 PROCEDURE — E0781 EXTERNAL AMBULATORY INFUS PU: HCPCS | Mod: RR

## 2025-02-21 PROCEDURE — E0781 EXTERNAL AMBULATORY INFUS PU: HCPCS | Mod: RR

## 2025-02-22 PROCEDURE — E0781 EXTERNAL AMBULATORY INFUS PU: HCPCS | Mod: RR

## 2025-02-23 ENCOUNTER — HEALTH MAINTENANCE LETTER (OUTPATIENT)
Age: 50
End: 2025-02-23

## 2025-02-23 PROCEDURE — E0781 EXTERNAL AMBULATORY INFUS PU: HCPCS | Mod: RR

## 2025-02-24 PROCEDURE — E0781 EXTERNAL AMBULATORY INFUS PU: HCPCS | Mod: RR

## 2025-02-25 PROCEDURE — E0781 EXTERNAL AMBULATORY INFUS PU: HCPCS | Mod: RR

## 2025-02-26 PROCEDURE — E0781 EXTERNAL AMBULATORY INFUS PU: HCPCS | Mod: RR

## 2025-02-27 PROCEDURE — E0781 EXTERNAL AMBULATORY INFUS PU: HCPCS | Mod: RR

## 2025-02-28 PROCEDURE — E0781 EXTERNAL AMBULATORY INFUS PU: HCPCS | Mod: RR

## 2025-03-01 PROCEDURE — E0781 EXTERNAL AMBULATORY INFUS PU: HCPCS | Mod: RR

## 2025-03-02 PROCEDURE — E0781 EXTERNAL AMBULATORY INFUS PU: HCPCS | Mod: RR

## 2025-03-03 PROCEDURE — E0781 EXTERNAL AMBULATORY INFUS PU: HCPCS | Mod: RR

## 2025-03-04 PROCEDURE — E0781 EXTERNAL AMBULATORY INFUS PU: HCPCS | Mod: RR

## 2025-03-05 PROCEDURE — E0781 EXTERNAL AMBULATORY INFUS PU: HCPCS | Mod: RR

## 2025-03-06 PROCEDURE — E0781 EXTERNAL AMBULATORY INFUS PU: HCPCS | Mod: RR

## 2025-03-07 PROCEDURE — E0781 EXTERNAL AMBULATORY INFUS PU: HCPCS | Mod: RR

## 2025-03-08 PROCEDURE — E0781 EXTERNAL AMBULATORY INFUS PU: HCPCS | Mod: RR

## 2025-03-09 PROCEDURE — E0781 EXTERNAL AMBULATORY INFUS PU: HCPCS | Mod: RR

## 2025-03-10 PROCEDURE — E0781 EXTERNAL AMBULATORY INFUS PU: HCPCS | Mod: RR

## 2025-03-11 PROCEDURE — E0781 EXTERNAL AMBULATORY INFUS PU: HCPCS | Mod: RR

## 2025-03-12 PROCEDURE — E0781 EXTERNAL AMBULATORY INFUS PU: HCPCS | Mod: RR

## 2025-03-13 PROCEDURE — E0781 EXTERNAL AMBULATORY INFUS PU: HCPCS | Mod: RR

## 2025-03-14 PROCEDURE — E0781 EXTERNAL AMBULATORY INFUS PU: HCPCS | Mod: RR

## 2025-03-15 PROCEDURE — E0781 EXTERNAL AMBULATORY INFUS PU: HCPCS | Mod: RR

## 2025-03-16 PROCEDURE — E0781 EXTERNAL AMBULATORY INFUS PU: HCPCS | Mod: RR

## 2025-03-17 PROCEDURE — E0781 EXTERNAL AMBULATORY INFUS PU: HCPCS | Mod: RR

## 2025-03-18 PROCEDURE — E0781 EXTERNAL AMBULATORY INFUS PU: HCPCS | Mod: RR

## 2025-03-19 PROCEDURE — E0781 EXTERNAL AMBULATORY INFUS PU: HCPCS | Mod: RR

## 2025-03-20 PROCEDURE — E0781 EXTERNAL AMBULATORY INFUS PU: HCPCS | Mod: RR

## 2025-03-21 PROCEDURE — E0781 EXTERNAL AMBULATORY INFUS PU: HCPCS | Mod: RR

## 2025-03-22 PROCEDURE — E0781 EXTERNAL AMBULATORY INFUS PU: HCPCS | Mod: RR

## 2025-03-23 PROCEDURE — E0781 EXTERNAL AMBULATORY INFUS PU: HCPCS | Mod: RR

## 2025-03-24 PROCEDURE — E0781 EXTERNAL AMBULATORY INFUS PU: HCPCS | Mod: RR

## 2025-03-25 PROCEDURE — E0781 EXTERNAL AMBULATORY INFUS PU: HCPCS | Mod: RR

## 2025-03-26 PROCEDURE — E0781 EXTERNAL AMBULATORY INFUS PU: HCPCS | Mod: RR

## 2025-03-27 PROCEDURE — E0781 EXTERNAL AMBULATORY INFUS PU: HCPCS | Mod: RR

## 2025-03-28 PROCEDURE — E0781 EXTERNAL AMBULATORY INFUS PU: HCPCS | Mod: RR

## 2025-03-29 PROCEDURE — E0781 EXTERNAL AMBULATORY INFUS PU: HCPCS | Mod: RR

## 2025-03-30 PROCEDURE — E0781 EXTERNAL AMBULATORY INFUS PU: HCPCS | Mod: RR

## 2025-03-31 PROCEDURE — E0781 EXTERNAL AMBULATORY INFUS PU: HCPCS | Mod: RR

## 2025-04-01 PROCEDURE — E0781 EXTERNAL AMBULATORY INFUS PU: HCPCS | Mod: RR

## 2025-04-02 PROCEDURE — E0781 EXTERNAL AMBULATORY INFUS PU: HCPCS | Mod: RR

## 2025-04-03 PROCEDURE — E0781 EXTERNAL AMBULATORY INFUS PU: HCPCS | Mod: RR

## 2025-04-04 PROCEDURE — E0781 EXTERNAL AMBULATORY INFUS PU: HCPCS | Mod: RR

## 2025-04-05 PROCEDURE — E0781 EXTERNAL AMBULATORY INFUS PU: HCPCS | Mod: RR

## 2025-04-06 PROCEDURE — E0781 EXTERNAL AMBULATORY INFUS PU: HCPCS | Mod: RR

## 2025-04-07 PROCEDURE — E0781 EXTERNAL AMBULATORY INFUS PU: HCPCS | Mod: RR

## 2025-04-08 PROCEDURE — E0781 EXTERNAL AMBULATORY INFUS PU: HCPCS | Mod: RR

## 2025-04-09 PROCEDURE — E0781 EXTERNAL AMBULATORY INFUS PU: HCPCS | Mod: RR

## 2025-04-10 PROCEDURE — E0781 EXTERNAL AMBULATORY INFUS PU: HCPCS | Mod: RR

## 2025-04-11 PROCEDURE — E0781 EXTERNAL AMBULATORY INFUS PU: HCPCS | Mod: RR

## 2025-04-12 PROCEDURE — E0781 EXTERNAL AMBULATORY INFUS PU: HCPCS | Mod: RR

## 2025-04-13 PROCEDURE — E0781 EXTERNAL AMBULATORY INFUS PU: HCPCS | Mod: RR

## 2025-04-14 PROCEDURE — E0781 EXTERNAL AMBULATORY INFUS PU: HCPCS | Mod: RR

## 2025-04-15 PROCEDURE — E0781 EXTERNAL AMBULATORY INFUS PU: HCPCS | Mod: RR

## 2025-04-16 PROCEDURE — E0781 EXTERNAL AMBULATORY INFUS PU: HCPCS | Mod: RR

## 2025-04-17 PROCEDURE — E0781 EXTERNAL AMBULATORY INFUS PU: HCPCS | Mod: RR

## 2025-04-18 PROCEDURE — E0781 EXTERNAL AMBULATORY INFUS PU: HCPCS | Mod: RR

## 2025-04-19 PROCEDURE — E0781 EXTERNAL AMBULATORY INFUS PU: HCPCS | Mod: RR

## 2025-04-20 PROCEDURE — E0781 EXTERNAL AMBULATORY INFUS PU: HCPCS | Mod: RR

## 2025-04-21 PROCEDURE — E0781 EXTERNAL AMBULATORY INFUS PU: HCPCS | Mod: RR

## 2025-04-22 PROCEDURE — E0781 EXTERNAL AMBULATORY INFUS PU: HCPCS | Mod: RR

## 2025-04-23 PROCEDURE — E0781 EXTERNAL AMBULATORY INFUS PU: HCPCS | Mod: RR

## 2025-04-24 PROCEDURE — E0781 EXTERNAL AMBULATORY INFUS PU: HCPCS | Mod: RR

## 2025-04-25 PROCEDURE — E0781 EXTERNAL AMBULATORY INFUS PU: HCPCS | Mod: RR

## 2025-04-26 PROCEDURE — E0781 EXTERNAL AMBULATORY INFUS PU: HCPCS | Mod: RR

## 2025-04-27 PROCEDURE — E0781 EXTERNAL AMBULATORY INFUS PU: HCPCS | Mod: RR

## 2025-04-28 PROCEDURE — E0781 EXTERNAL AMBULATORY INFUS PU: HCPCS | Mod: RR

## 2025-04-29 PROCEDURE — E0781 EXTERNAL AMBULATORY INFUS PU: HCPCS | Mod: RR

## 2025-04-30 PROCEDURE — E0781 EXTERNAL AMBULATORY INFUS PU: HCPCS | Mod: RR

## 2025-05-01 PROCEDURE — E0781 EXTERNAL AMBULATORY INFUS PU: HCPCS | Mod: RR

## 2025-05-02 PROCEDURE — E0781 EXTERNAL AMBULATORY INFUS PU: HCPCS | Mod: RR

## 2025-05-03 PROCEDURE — E0781 EXTERNAL AMBULATORY INFUS PU: HCPCS | Mod: RR

## 2025-05-04 PROCEDURE — E0781 EXTERNAL AMBULATORY INFUS PU: HCPCS | Mod: RR

## 2025-05-05 PROCEDURE — E0781 EXTERNAL AMBULATORY INFUS PU: HCPCS | Mod: RR

## 2025-05-06 PROCEDURE — E0781 EXTERNAL AMBULATORY INFUS PU: HCPCS | Mod: RR

## 2025-05-07 PROCEDURE — E0781 EXTERNAL AMBULATORY INFUS PU: HCPCS | Mod: RR

## 2025-05-08 PROCEDURE — E0781 EXTERNAL AMBULATORY INFUS PU: HCPCS | Mod: RR

## 2025-05-09 PROCEDURE — E0781 EXTERNAL AMBULATORY INFUS PU: HCPCS | Mod: RR

## 2025-05-10 PROCEDURE — E0781 EXTERNAL AMBULATORY INFUS PU: HCPCS | Mod: RR

## 2025-05-11 PROCEDURE — E0781 EXTERNAL AMBULATORY INFUS PU: HCPCS | Mod: RR

## 2025-05-12 PROCEDURE — E0781 EXTERNAL AMBULATORY INFUS PU: HCPCS | Mod: RR

## 2025-05-13 PROCEDURE — E0781 EXTERNAL AMBULATORY INFUS PU: HCPCS | Mod: RR

## 2025-05-14 PROCEDURE — E0781 EXTERNAL AMBULATORY INFUS PU: HCPCS | Mod: RR

## 2025-05-15 PROCEDURE — E0781 EXTERNAL AMBULATORY INFUS PU: HCPCS | Mod: RR

## 2025-05-16 ENCOUNTER — HOME INFUSION (OUTPATIENT)
Dept: HOME HEALTH SERVICES | Facility: HOME HEALTH | Age: 50
End: 2025-05-16
Payer: COMMERCIAL

## 2025-05-16 PROCEDURE — E0781 EXTERNAL AMBULATORY INFUS PU: HCPCS | Mod: RR

## 2025-05-17 PROCEDURE — E0781 EXTERNAL AMBULATORY INFUS PU: HCPCS | Mod: RR

## 2025-05-18 PROCEDURE — E0781 EXTERNAL AMBULATORY INFUS PU: HCPCS | Mod: RR

## 2025-05-19 PROCEDURE — E0781 EXTERNAL AMBULATORY INFUS PU: HCPCS | Mod: RR

## 2025-05-23 ENCOUNTER — HOME INFUSION (OUTPATIENT)
Dept: HOME HEALTH SERVICES | Facility: HOME HEALTH | Age: 50
End: 2025-05-23
Payer: COMMERCIAL

## 2025-05-29 ENCOUNTER — HOME CARE VISIT (OUTPATIENT)
Dept: HOME HEALTH SERVICES | Facility: HOME HEALTH | Age: 50
End: 2025-05-29
Payer: COMMERCIAL

## 2025-05-29 ENCOUNTER — HOME INFUSION BILLING (OUTPATIENT)
Dept: HOME HEALTH SERVICES | Facility: HOME HEALTH | Age: 50
End: 2025-05-29
Payer: COMMERCIAL

## 2025-05-29 VITALS
SYSTOLIC BLOOD PRESSURE: 121 MMHG | HEART RATE: 78 BPM | OXYGEN SATURATION: 100 % | RESPIRATION RATE: 16 BRPM | DIASTOLIC BLOOD PRESSURE: 78 MMHG | WEIGHT: 132.5 LBS | TEMPERATURE: 97.1 F

## 2025-05-29 PROCEDURE — S1015 IV TUBING EXTENSION SET: HCPCS

## 2025-05-29 PROCEDURE — A4215 STERILE NEEDLE: HCPCS

## 2025-05-29 PROCEDURE — A4222 INFUSION SUPPLIES WITH PUMP: HCPCS

## 2025-05-29 PROCEDURE — E0781 EXTERNAL AMBULATORY INFUS PU: HCPCS | Mod: RR

## 2025-06-29 PROCEDURE — E0781 EXTERNAL AMBULATORY INFUS PU: HCPCS | Mod: RR

## 2025-07-29 PROCEDURE — E0781 EXTERNAL AMBULATORY INFUS PU: HCPCS | Mod: RR

## 2025-08-29 PROCEDURE — E0781 EXTERNAL AMBULATORY INFUS PU: HCPCS | Mod: RR

## 2025-09-29 PROCEDURE — E0781 EXTERNAL AMBULATORY INFUS PU: HCPCS | Mod: RR

## 2025-10-29 PROCEDURE — E0781 EXTERNAL AMBULATORY INFUS PU: HCPCS | Mod: RR
